# Patient Record
Sex: FEMALE | Race: BLACK OR AFRICAN AMERICAN | NOT HISPANIC OR LATINO | Employment: OTHER | URBAN - METROPOLITAN AREA
[De-identification: names, ages, dates, MRNs, and addresses within clinical notes are randomized per-mention and may not be internally consistent; named-entity substitution may affect disease eponyms.]

---

## 2023-11-02 RX ORDER — ONDANSETRON 4 MG/1
TABLET, ORALLY DISINTEGRATING ORAL
COMMUNITY
Start: 2023-05-10 | End: 2024-05-22

## 2023-11-02 RX ORDER — OXYCODONE AND ACETAMINOPHEN 5; 325 MG/1; MG/1
TABLET ORAL
COMMUNITY
Start: 2023-06-30 | End: 2024-01-03 | Stop reason: ALTCHOICE

## 2023-11-02 RX ORDER — LINEZOLID 600 MG/1
TABLET, FILM COATED ORAL
COMMUNITY
Start: 2022-07-21 | End: 2024-01-03 | Stop reason: ALTCHOICE

## 2023-11-02 RX ORDER — SUMATRIPTAN SUCCINATE 25 MG/1
TABLET ORAL
COMMUNITY
Start: 2023-02-11 | End: 2024-01-03 | Stop reason: ALTCHOICE

## 2023-11-02 RX ORDER — SULFAMETHOXAZOLE AND TRIMETHOPRIM 800; 160 MG/1; MG/1
1 TABLET ORAL 2 TIMES DAILY
COMMUNITY
Start: 2023-06-29 | End: 2024-01-03 | Stop reason: ALTCHOICE

## 2023-11-02 RX ORDER — AZITHROMYCIN 250 MG/1
TABLET, FILM COATED ORAL
COMMUNITY
Start: 2023-07-02 | End: 2024-01-03 | Stop reason: ALTCHOICE

## 2023-11-02 RX ORDER — TAMSULOSIN HYDROCHLORIDE 0.4 MG/1
0.4 CAPSULE ORAL DAILY
COMMUNITY
Start: 2023-10-19 | End: 2024-01-03 | Stop reason: ALTCHOICE

## 2023-11-02 RX ORDER — MIRABEGRON 50 MG/1
50 TABLET, FILM COATED, EXTENDED RELEASE ORAL DAILY
COMMUNITY
Start: 2023-10-19

## 2023-11-02 RX ORDER — PANTOPRAZOLE SODIUM 40 MG/1
40 TABLET, DELAYED RELEASE ORAL DAILY
COMMUNITY
End: 2024-06-05 | Stop reason: ALTCHOICE

## 2023-11-02 RX ORDER — PANTOPRAZOLE SODIUM 20 MG/1
TABLET, DELAYED RELEASE ORAL
COMMUNITY

## 2023-11-02 RX ORDER — VIBEGRON 75 MG/1
1 TABLET, FILM COATED ORAL DAILY
COMMUNITY
Start: 2023-10-04

## 2023-11-02 RX ORDER — INDAPAMIDE 2.5 MG/1
TABLET ORAL
COMMUNITY
End: 2024-01-03 | Stop reason: ALTCHOICE

## 2023-11-02 RX ORDER — EPINEPHRINE 0.3 MG/.3ML
INJECTION SUBCUTANEOUS
COMMUNITY
Start: 2018-09-28

## 2023-11-02 RX ORDER — METOPROLOL TARTRATE 25 MG/1
25 TABLET, FILM COATED ORAL 2 TIMES DAILY
COMMUNITY

## 2023-11-02 RX ORDER — CIPROFLOXACIN 500 MG/1
500 TABLET ORAL 2 TIMES DAILY
COMMUNITY
End: 2024-01-03 | Stop reason: ALTCHOICE

## 2023-11-02 RX ORDER — ACETAMINOPHEN 500 MG
TABLET ORAL
COMMUNITY
Start: 2022-08-12 | End: 2024-05-22

## 2023-11-02 RX ORDER — METOPROLOL SUCCINATE 25 MG/1
TABLET, EXTENDED RELEASE ORAL
COMMUNITY
End: 2024-06-05 | Stop reason: ALTCHOICE

## 2023-11-02 RX ORDER — POLYETHYLENE GLYCOL 3350, SODIUM SULFATE ANHYDROUS, SODIUM BICARBONATE, SODIUM CHLORIDE, POTASSIUM CHLORIDE 236; 22.74; 6.74; 5.86; 2.97 G/4L; G/4L; G/4L; G/4L; G/4L
POWDER, FOR SOLUTION ORAL
COMMUNITY
Start: 2023-04-13 | End: 2023-11-08

## 2023-11-02 RX ORDER — NYSTATIN 100000 [USP'U]/G
POWDER TOPICAL
COMMUNITY
Start: 2022-08-12 | End: 2024-01-03 | Stop reason: ALTCHOICE

## 2023-11-02 RX ORDER — MONTELUKAST SODIUM 10 MG/1
TABLET ORAL
COMMUNITY

## 2023-11-02 RX ORDER — FLUTICASONE PROPIONATE AND SALMETEROL 50; 250 UG/1; UG/1
POWDER RESPIRATORY (INHALATION)
COMMUNITY

## 2023-11-02 RX ORDER — OXYBUTYNIN CHLORIDE 10 MG/1
10 TABLET, EXTENDED RELEASE ORAL DAILY
COMMUNITY
End: 2024-01-03 | Stop reason: ALTCHOICE

## 2023-11-02 RX ORDER — ACETAMINOPHEN 325 MG/1
TABLET ORAL
COMMUNITY
End: 2024-05-22

## 2023-11-02 RX ORDER — OMEPRAZOLE 40 MG/1
CAPSULE, DELAYED RELEASE ORAL
COMMUNITY
Start: 2023-04-13 | End: 2024-01-03 | Stop reason: ALTCHOICE

## 2023-11-02 RX ORDER — ALBUTEROL SULFATE 90 UG/1
AEROSOL, METERED RESPIRATORY (INHALATION)
COMMUNITY
Start: 2012-12-17

## 2023-11-02 RX ORDER — NAPROXEN 500 MG/1
TABLET ORAL
COMMUNITY
End: 2024-01-03 | Stop reason: ALTCHOICE

## 2023-11-07 ENCOUNTER — APPOINTMENT (OUTPATIENT)
Dept: UROLOGY | Facility: CLINIC | Age: 48
End: 2023-11-07
Payer: MEDICARE

## 2023-11-08 ENCOUNTER — APPOINTMENT (OUTPATIENT)
Dept: RHEUMATOLOGY | Facility: CLINIC | Age: 48
End: 2023-11-08
Payer: MEDICARE

## 2023-11-13 ENCOUNTER — TRANSCRIBE ORDERS (OUTPATIENT)
Dept: GASTROENTEROLOGY | Facility: CLINIC | Age: 48
End: 2023-11-13

## 2023-11-13 ENCOUNTER — APPOINTMENT (OUTPATIENT)
Dept: UROLOGY | Facility: CLINIC | Age: 48
End: 2023-11-13
Payer: MEDICARE

## 2023-11-13 DIAGNOSIS — F32.A DEPRESSION, UNSPECIFIED DEPRESSION TYPE: Primary | ICD-10-CM

## 2023-12-05 ENCOUNTER — ANCILLARY PROCEDURE (OUTPATIENT)
Dept: RADIOLOGY | Facility: CLINIC | Age: 48
End: 2023-12-05
Payer: MEDICARE

## 2023-12-05 VITALS — HEIGHT: 65 IN | WEIGHT: 183.86 LBS | BODY MASS INDEX: 30.63 KG/M2

## 2023-12-05 DIAGNOSIS — Z12.31 ENCOUNTER FOR SCREENING MAMMOGRAM FOR MALIGNANT NEOPLASM OF BREAST: ICD-10-CM

## 2023-12-05 PROCEDURE — 77063 BREAST TOMOSYNTHESIS BI: CPT | Performed by: RADIOLOGY

## 2023-12-05 PROCEDURE — 77067 SCR MAMMO BI INCL CAD: CPT | Performed by: RADIOLOGY

## 2023-12-05 PROCEDURE — 77067 SCR MAMMO BI INCL CAD: CPT

## 2023-12-11 ENCOUNTER — APPOINTMENT (OUTPATIENT)
Dept: RHEUMATOLOGY | Facility: CLINIC | Age: 48
End: 2023-12-11
Payer: MEDICARE

## 2023-12-18 ENCOUNTER — APPOINTMENT (OUTPATIENT)
Dept: GASTROENTEROLOGY | Facility: HOSPITAL | Age: 48
End: 2023-12-18
Payer: MEDICARE

## 2023-12-21 ENCOUNTER — OFFICE VISIT (OUTPATIENT)
Dept: RHEUMATOLOGY | Facility: CLINIC | Age: 48
End: 2023-12-21
Payer: MEDICARE

## 2023-12-21 ENCOUNTER — LAB (OUTPATIENT)
Dept: LAB | Facility: LAB | Age: 48
End: 2023-12-21
Payer: MEDICARE

## 2023-12-21 VITALS
WEIGHT: 197 LBS | SYSTOLIC BLOOD PRESSURE: 150 MMHG | HEIGHT: 65 IN | DIASTOLIC BLOOD PRESSURE: 95 MMHG | BODY MASS INDEX: 32.82 KG/M2 | HEART RATE: 74 BPM

## 2023-12-21 DIAGNOSIS — M13.0 POLYARTHRITIS: ICD-10-CM

## 2023-12-21 DIAGNOSIS — M79.7 FIBROMYALGIA: Primary | ICD-10-CM

## 2023-12-21 DIAGNOSIS — M79.7 FIBROMYALGIA: ICD-10-CM

## 2023-12-21 LAB
C3 SERPL-MCNC: 130 MG/DL (ref 87–200)
C4 SERPL-MCNC: 30 MG/DL (ref 10–50)
CCP IGG SERPL-ACNC: <1 U/ML
CENTROMERE B AB SER-ACNC: <0.2 AI
CHROMATIN AB SERPL-ACNC: <0.2 AI
CRP SERPL-MCNC: 0.32 MG/DL
DSDNA AB SER-ACNC: <1 IU/ML
ENA JO1 AB SER QL IA: <0.2 AI
ENA RNP AB SER IA-ACNC: 0.3 AI
ENA SCL70 AB SER QL IA: <0.2 AI
ENA SM AB SER IA-ACNC: <0.2 AI
ENA SM+RNP AB SER QL IA: <0.2 AI
ENA SS-A AB SER IA-ACNC: <0.2 AI
ENA SS-B AB SER IA-ACNC: <0.2 AI
HCV AB SER QL: NONREACTIVE
IGG SERPL-MCNC: 1470 MG/DL (ref 700–1600)
IGG1 SER-MCNC: 1020 MG/DL (ref 490–1140)
IGG2 SER-MCNC: 276 MG/DL (ref 150–640)
IGG3 SER-MCNC: 61 MG/DL (ref 11–85)
IGG4 SER-MCNC: 2 MG/DL (ref 3–200)
LIPASE SERPL-CCNC: 139 U/L (ref 9–82)
RHEUMATOID FACT SER NEPH-ACNC: <10 IU/ML (ref 0–15)
RIBOSOMAL P AB SER-ACNC: <0.2 AI
URATE SERPL-MCNC: 4.2 MG/DL (ref 2.3–6.7)

## 2023-12-21 PROCEDURE — 86200 CCP ANTIBODY: CPT

## 2023-12-21 PROCEDURE — 86235 NUCLEAR ANTIGEN ANTIBODY: CPT

## 2023-12-21 PROCEDURE — 84550 ASSAY OF BLOOD/URIC ACID: CPT

## 2023-12-21 PROCEDURE — 86038 ANTINUCLEAR ANTIBODIES: CPT

## 2023-12-21 PROCEDURE — 86160 COMPLEMENT ANTIGEN: CPT

## 2023-12-21 PROCEDURE — 82784 ASSAY IGA/IGD/IGG/IGM EACH: CPT

## 2023-12-21 PROCEDURE — 83690 ASSAY OF LIPASE: CPT

## 2023-12-21 PROCEDURE — 86803 HEPATITIS C AB TEST: CPT

## 2023-12-21 PROCEDURE — 86225 DNA ANTIBODY NATIVE: CPT

## 2023-12-21 PROCEDURE — 83516 IMMUNOASSAY NONANTIBODY: CPT

## 2023-12-21 PROCEDURE — 99213 OFFICE O/P EST LOW 20 MIN: CPT | Performed by: INTERNAL MEDICINE

## 2023-12-21 PROCEDURE — 86036 ANCA SCREEN EACH ANTIBODY: CPT

## 2023-12-21 PROCEDURE — 36415 COLL VENOUS BLD VENIPUNCTURE: CPT

## 2023-12-21 PROCEDURE — 86431 RHEUMATOID FACTOR QUANT: CPT

## 2023-12-21 PROCEDURE — 86140 C-REACTIVE PROTEIN: CPT

## 2023-12-21 ASSESSMENT — PAIN SCALES - GENERAL: PAINLEVEL: 8

## 2023-12-21 NOTE — PROGRESS NOTES
PP: 48 year-old female with history of fibromyalgia, lumbar and cervical spondylosis, osteoarthritis of the knees and left shoulder.  CC: Lower back pain and right knee pain.  Jamul: She has a long-standing history of lower back pain with some extension into either lower extremity. She notes numbness and tingling in her fingers and toes. She had muscle spasms in her calves. She notes frequent urination and fecal incontinence. She sometimes is unable to feel when she needs to have a bowel movement. She has frequent nighttime urination that awakens her from sleep without urinary incontinence or burning dysuria. She had bladder mesh placed around 2008 or 2010 for urinary incontinence associated with coughing or sneezing. The bowel and urinary incontinence has been worsening over the past 2 years. The bowel incontinence occurs spontaneously. She has not noted any loose bowel movements. She has intermittent muscle spasms in the left inframammary region where she had rib fractures in the remote past. She has tried Topamax that caused abdominal discomfort as well as diarrhea, muscle relaxants (baclofen, cyclobenzaprine) antidepressants (amitriptyline, duloxetine) without significant improvement in symptoms. She previously felt that the gabapentin was contributing to her bowel problems.  However the bowel problems have continued since stopping the gabapentin.She was evaluated by gastroenterology and was felt to have spasms in the lower colon for which she was prescribed dicyclomine. She also notes pain involving her knees particularly the right knee and pain in the left shoulder.She continues to have pain in the right knee following surgical debridement of the right knee 7/18/2023 for nontraumatic inflammatory arthritis of the right knee for possible septic arthritis.  Cultures were negative.  She also notes pain in the shoulders and for the past 3 weeks she has noted numbness in the tip of the third digit of the left hand.   She is planning to see urology tomorrow and have EGD and colonoscopy tomorrow as well.  PH: Allergies: Penicillin (hives), Skelaxin (hives), tramadol (hives), be staying (asthma symptoms) Topamax (diarrhea); illnesses: Asthma, bilateral nephrolithiasis, osteoarthritis of the knees and left shoulder, cervical disc herniation C4/C5, C5/C6, and C6/C7, lumbar disc herniation L1/L2, L3/L4, L5/S1, fibromyalgia; surgeries bladder suspension with mesh ( or ), cystoscopy with laser lithotripsy (3/6/2012),  section, D&C, hysterectomy, lumbar epidural nerve block.,  Surgical debridement of the right knee (2023 for culture-negative septic arthritis).  SH: She is a current tobacco smoker. She drinks alcohol occasionally. She denies illicit drug use. She has been on disability since .  FH: Her father  with pancreatic cancer. Her mother has systemic lupus erythematosus, rheumatoid arthritis, hyperparathyroidism, vitamin D deficiency. She has a brother with asthma and another brother who is healthy. She has a son who  with heart attack.  She has 2 other sons with GERD and asthma. She has 3 daughters that are healthy. She has no sisters.  P X: She is a well-developed, well-nourished, mildly overweight, black female. The lungs are clear to auscultation. The heart is regular in rhythm. The abdomen is benign. The extremities are without edema.  The neurological examination shows that the Tinel sign is normal at both wrists. There is good muscle strength in the upper and lower extremities. The musculoskeletal examination shows preserved range of motion of the upper and lower extremity joints. There are no joint effusions.  There is pain on palpation as well as passive abduction and extension of the shoulders.The straight leg raise test is normal bilaterally in the seated position. There is lower back pain with hip flexion bilaterally.   Laboratory (2023) alkaline phosphatase 120, BUN 12, creatinine  0.71, glucose 100, lipase 141, WBC 12.1, hemoglobin 15.5, hematocrit 44.8, MCV 86, MCHC 34.6, platelets 362.  CT scan abdomen and pelvis (6/26/2023) the liver, pancreas, gallbladder, spleen, adrenal glands are normal.  There are small nonobstructing renal calculi in both kidneys.  The uterus is surgically absent.  The ovaries are normal.  Submucosal fat deposits within the ascending colon.  There is mild to moderate osteoarthritis of both hips.  There are mild degenerative changes of the spine.  Impression: 48  year-old black female with history of cervical and lumbar disc herniation, chronic lower back pain, numb paresthesias involving the hands and feet, fibromyalgia, osteoarthritis of the knees and left shoulder presented with history of radicular symptoms into the lower extremities and urinary and fecal incontinence or urgency. She has past history of bladder mesh suspension for urinary incontinence. Question neurogenic bowel and neurogenic bladder versus anatomic abnormalities.  Plan: She is to continue with urology regarding the bladder symptoms for possible neurogenic or structural etiology of the urinary and bowel incontinence or urgency.  Continue gastroenterology evaluation. She is to have laboratory for IgG subclasses, ANCA, KASIA panel, rheumatoid factor, anti-CCP, CRP, C3, C4.No new medications were prescribed. She is to continue with her current medications. She is to return at next available APPOINTMENT.

## 2023-12-21 NOTE — PATIENT INSTRUCTIONS
Patient has polyarticular inflammatory arthritis, fibromyalgia, urinary urgency, fecal incontinence, chronic abdominal pain, elevated serum lipase, s/p culture negative septic arthritis of the right knee.    She is to continue with plans for GI and Urology workup.

## 2023-12-22 ENCOUNTER — HOSPITAL ENCOUNTER (OUTPATIENT)
Dept: GASTROENTEROLOGY | Facility: HOSPITAL | Age: 48
Setting detail: OUTPATIENT SURGERY
Discharge: HOME | End: 2023-12-22
Payer: MEDICARE

## 2023-12-22 ENCOUNTER — ANESTHESIA (OUTPATIENT)
Dept: GASTROENTEROLOGY | Facility: HOSPITAL | Age: 48
End: 2023-12-22
Payer: MEDICARE

## 2023-12-22 ENCOUNTER — ANESTHESIA EVENT (OUTPATIENT)
Dept: GASTROENTEROLOGY | Facility: HOSPITAL | Age: 48
End: 2023-12-22
Payer: MEDICARE

## 2023-12-22 VITALS
DIASTOLIC BLOOD PRESSURE: 77 MMHG | OXYGEN SATURATION: 98 % | WEIGHT: 195 LBS | HEART RATE: 78 BPM | RESPIRATION RATE: 20 BRPM | TEMPERATURE: 98.6 F | BODY MASS INDEX: 32.49 KG/M2 | SYSTOLIC BLOOD PRESSURE: 135 MMHG | HEIGHT: 65 IN

## 2023-12-22 DIAGNOSIS — R15.9 FULL INCONTINENCE OF FECES: Primary | ICD-10-CM

## 2023-12-22 DIAGNOSIS — R10.9 UNSPECIFIED ABDOMINAL PAIN: ICD-10-CM

## 2023-12-22 DIAGNOSIS — K21.9 GASTRO-ESOPHAGEAL REFLUX DISEASE WITHOUT ESOPHAGITIS: ICD-10-CM

## 2023-12-22 PROBLEM — N39.0 URINARY TRACT INFECTION: Status: ACTIVE | Noted: 2023-12-22

## 2023-12-22 PROBLEM — N20.0 RENAL CALCULI: Status: ACTIVE | Noted: 2023-12-22

## 2023-12-22 PROBLEM — M79.7 FIBROMYALGIA: Status: ACTIVE | Noted: 2023-12-22

## 2023-12-22 PROBLEM — J45.40 MODERATE PERSISTENT ASTHMA WITHOUT COMPLICATION (HHS-HCC): Status: ACTIVE | Noted: 2023-12-22

## 2023-12-22 PROCEDURE — 43235 EGD DIAGNOSTIC BRUSH WASH: CPT | Performed by: INTERNAL MEDICINE

## 2023-12-22 PROCEDURE — 7100000010 HC PHASE TWO TIME - EACH INCREMENTAL 1 MINUTE

## 2023-12-22 PROCEDURE — 2500000004 HC RX 250 GENERAL PHARMACY W/ HCPCS (ALT 636 FOR OP/ED)

## 2023-12-22 PROCEDURE — 88305 TISSUE EXAM BY PATHOLOGIST: CPT | Performed by: PATHOLOGY

## 2023-12-22 PROCEDURE — 2500000002 HC RX 250 W HCPCS SELF ADMINISTERED DRUGS (ALT 637 FOR MEDICARE OP, ALT 636 FOR OP/ED): Performed by: ANESTHESIOLOGY

## 2023-12-22 PROCEDURE — 3700000002 HC GENERAL ANESTHESIA TIME - EACH INCREMENTAL 1 MINUTE

## 2023-12-22 PROCEDURE — 3700000001 HC GENERAL ANESTHESIA TIME - INITIAL BASE CHARGE

## 2023-12-22 PROCEDURE — A45385 PR COLONOSCOPY,REMV LESN,SNARE

## 2023-12-22 PROCEDURE — 88305 TISSUE EXAM BY PATHOLOGIST: CPT | Mod: TC,SUR | Performed by: INTERNAL MEDICINE

## 2023-12-22 PROCEDURE — 7100000009 HC PHASE TWO TIME - INITIAL BASE CHARGE

## 2023-12-22 PROCEDURE — 2500000005 HC RX 250 GENERAL PHARMACY W/O HCPCS

## 2023-12-22 PROCEDURE — A45385 PR COLONOSCOPY,REMV LESN,SNARE: Performed by: ANESTHESIOLOGY

## 2023-12-22 PROCEDURE — 45385 COLONOSCOPY W/LESION REMOVAL: CPT | Performed by: INTERNAL MEDICINE

## 2023-12-22 RX ORDER — HYDRALAZINE HYDROCHLORIDE 20 MG/ML
5 INJECTION INTRAMUSCULAR; INTRAVENOUS EVERY 30 MIN PRN
Status: DISCONTINUED | OUTPATIENT
Start: 2023-12-22 | End: 2023-12-23 | Stop reason: HOSPADM

## 2023-12-22 RX ORDER — DROPERIDOL 2.5 MG/ML
0.62 INJECTION, SOLUTION INTRAMUSCULAR; INTRAVENOUS ONCE AS NEEDED
Status: DISCONTINUED | OUTPATIENT
Start: 2023-12-22 | End: 2023-12-23 | Stop reason: HOSPADM

## 2023-12-22 RX ORDER — OXYCODONE HYDROCHLORIDE 5 MG/1
5 TABLET ORAL EVERY 4 HOURS PRN
Status: DISCONTINUED | OUTPATIENT
Start: 2023-12-22 | End: 2023-12-23 | Stop reason: HOSPADM

## 2023-12-22 RX ORDER — LABETALOL HYDROCHLORIDE 5 MG/ML
5 INJECTION, SOLUTION INTRAVENOUS ONCE AS NEEDED
Status: DISCONTINUED | OUTPATIENT
Start: 2023-12-22 | End: 2023-12-23 | Stop reason: HOSPADM

## 2023-12-22 RX ORDER — MIDAZOLAM HYDROCHLORIDE 1 MG/ML
INJECTION INTRAMUSCULAR; INTRAVENOUS AS NEEDED
Status: DISCONTINUED | OUTPATIENT
Start: 2023-12-22 | End: 2023-12-22

## 2023-12-22 RX ORDER — HYDROMORPHONE HYDROCHLORIDE 1 MG/ML
0.2 INJECTION, SOLUTION INTRAMUSCULAR; INTRAVENOUS; SUBCUTANEOUS EVERY 5 MIN PRN
Status: DISCONTINUED | OUTPATIENT
Start: 2023-12-22 | End: 2023-12-23 | Stop reason: HOSPADM

## 2023-12-22 RX ORDER — SODIUM CHLORIDE, SODIUM LACTATE, POTASSIUM CHLORIDE, CALCIUM CHLORIDE 600; 310; 30; 20 MG/100ML; MG/100ML; MG/100ML; MG/100ML
100 INJECTION, SOLUTION INTRAVENOUS CONTINUOUS
Status: DISCONTINUED | OUTPATIENT
Start: 2023-12-22 | End: 2023-12-23 | Stop reason: HOSPADM

## 2023-12-22 RX ORDER — LIDOCAINE HYDROCHLORIDE 20 MG/ML
INJECTION, SOLUTION INFILTRATION; PERINEURAL AS NEEDED
Status: DISCONTINUED | OUTPATIENT
Start: 2023-12-22 | End: 2023-12-22

## 2023-12-22 RX ORDER — METHOCARBAMOL 100 MG/ML
1000 INJECTION, SOLUTION INTRAMUSCULAR; INTRAVENOUS ONCE
Status: DISCONTINUED | OUTPATIENT
Start: 2023-12-22 | End: 2023-12-23 | Stop reason: HOSPADM

## 2023-12-22 RX ORDER — MIDAZOLAM HYDROCHLORIDE 1 MG/ML
0.5 INJECTION INTRAMUSCULAR; INTRAVENOUS
Status: DISCONTINUED | OUTPATIENT
Start: 2023-12-22 | End: 2023-12-23 | Stop reason: HOSPADM

## 2023-12-22 RX ORDER — HYDROMORPHONE HYDROCHLORIDE 1 MG/ML
0.4 INJECTION, SOLUTION INTRAMUSCULAR; INTRAVENOUS; SUBCUTANEOUS EVERY 5 MIN PRN
Status: DISCONTINUED | OUTPATIENT
Start: 2023-12-22 | End: 2023-12-23 | Stop reason: HOSPADM

## 2023-12-22 RX ORDER — PROPOFOL 10 MG/ML
INJECTION, EMULSION INTRAVENOUS CONTINUOUS PRN
Status: DISCONTINUED | OUTPATIENT
Start: 2023-12-22 | End: 2023-12-22

## 2023-12-22 RX ORDER — DIPHENHYDRAMINE HYDROCHLORIDE 50 MG/ML
12.5 INJECTION INTRAMUSCULAR; INTRAVENOUS ONCE AS NEEDED
Status: DISCONTINUED | OUTPATIENT
Start: 2023-12-22 | End: 2023-12-23 | Stop reason: HOSPADM

## 2023-12-22 RX ORDER — LIDOCAINE HYDROCHLORIDE 10 MG/ML
0.1 INJECTION INFILTRATION; PERINEURAL ONCE
Status: DISCONTINUED | OUTPATIENT
Start: 2023-12-22 | End: 2023-12-23 | Stop reason: HOSPADM

## 2023-12-22 RX ORDER — ALBUTEROL SULFATE 0.83 MG/ML
2.5 SOLUTION RESPIRATORY (INHALATION) ONCE AS NEEDED
Status: COMPLETED | OUTPATIENT
Start: 2023-12-22 | End: 2023-12-22

## 2023-12-22 RX ORDER — ACETAMINOPHEN 325 MG/1
650 TABLET ORAL EVERY 4 HOURS PRN
Status: DISCONTINUED | OUTPATIENT
Start: 2023-12-22 | End: 2023-12-23 | Stop reason: HOSPADM

## 2023-12-22 RX ORDER — ONDANSETRON HYDROCHLORIDE 2 MG/ML
4 INJECTION, SOLUTION INTRAVENOUS ONCE AS NEEDED
Status: DISCONTINUED | OUTPATIENT
Start: 2023-12-22 | End: 2023-12-23 | Stop reason: HOSPADM

## 2023-12-22 RX ADMIN — MIDAZOLAM HYDROCHLORIDE 2 MG: 1 INJECTION, SOLUTION INTRAMUSCULAR; INTRAVENOUS at 14:03

## 2023-12-22 RX ADMIN — SODIUM CHLORIDE, SODIUM LACTATE, POTASSIUM CHLORIDE, AND CALCIUM CHLORIDE: 600; 310; 30; 20 INJECTION, SOLUTION INTRAVENOUS at 14:05

## 2023-12-22 RX ADMIN — LIDOCAINE HYDROCHLORIDE 5 ML: 20 INJECTION, SOLUTION INFILTRATION; PERINEURAL at 14:06

## 2023-12-22 RX ADMIN — PROPOFOL 200 MCG/KG/MIN: 10 INJECTION, EMULSION INTRAVENOUS at 14:06

## 2023-12-22 RX ADMIN — ALBUTEROL SULFATE 2.5 MG: 2.5 SOLUTION RESPIRATORY (INHALATION) at 15:10

## 2023-12-22 ASSESSMENT — PAIN - FUNCTIONAL ASSESSMENT
PAIN_FUNCTIONAL_ASSESSMENT: 0-10

## 2023-12-22 ASSESSMENT — PAIN SCALES - GENERAL
PAINLEVEL_OUTOF10: 0 - NO PAIN

## 2023-12-22 ASSESSMENT — COLUMBIA-SUICIDE SEVERITY RATING SCALE - C-SSRS
2. HAVE YOU ACTUALLY HAD ANY THOUGHTS OF KILLING YOURSELF?: NO
6. HAVE YOU EVER DONE ANYTHING, STARTED TO DO ANYTHING, OR PREPARED TO DO ANYTHING TO END YOUR LIFE?: NO
1. IN THE PAST MONTH, HAVE YOU WISHED YOU WERE DEAD OR WISHED YOU COULD GO TO SLEEP AND NOT WAKE UP?: NO

## 2023-12-22 NOTE — DISCHARGE INSTRUCTIONS
Patient Instructions after an endoscopy or colonoscopy      The anesthetics, sedatives or narcotics which were given to you today will be acting in your body for the next 24 hours, so you might feel a little sleepy or groggy.  This feeling should slowly wear off. Carefully read and follow the instructions.     You received sedation today:  - Do not drive or operate any machinery or power tools of any kind.   - No alcoholic beverages today, not even beer or wine.  - Do not make any important decisions or sign any legal documents.  - No over the counter medications that contain alcohol or that may cause drowsiness.  - Do not make any important decisions or sign any legal documents.    While it is common to experience mild to moderate abdominal distention, gas, or belching after your procedure, if any of these symptoms occur following discharge from the GI Lab or within one week of having your procedure, call the [facility] to be advised whether a visit to your nearest Urgent Care or Emergency Department is indicated.  Take this paper with you if you go.     - If you develop an allergic reaction to the medications that were given during your procedure such as difficulty breathing, rash, hives, severe nausea, vomiting or lightheadedness.- If you experience chest pain, shortness of breath, severe abdominal pain, fevers and chills.    -If you develop signs and symptoms of bleeding such as blood in your spit, if your stools turn black, tarry, or bloody    - If you have not urinated within 8 hours following your procedure.- If your IV site becomes painful, red, inflamed, or looks infected.Patient Instructions after a Colonoscopy      The anesthetics, sedatives or narcotics which were given to you today will be acting in your body for the next 24 hours, so you might feel a little sleepy or groggy.  This feeling should slowly wear off. Carefully read and follow the instructions.     You received sedation  today:  - Do not drive or operate any machinery or power tools of any kind.   - No alcoholic beverages today, not even beer or wine.  - Do not make any important decisions or sign any legal documents.  - No over the counter medications that contain alcohol or that may cause drowsiness.  - Do not make any important decisions or sign any legal documents.    While it is common to experience mild to moderate abdominal distention, gas, or belching after your procedure, if any of these symptoms occur following discharge from the GI Lab or within one week of having your procedure, call the [facility] to be advised whether a visit to your nearest Urgent Care or Emergency Department is indicated.  Take this paper with you if you go.     - If you develop an allergic reaction to the medications that were given during your procedure such as difficulty breathing, rash, hives, severe nausea, vomiting or lightheadedness.  - If you experience chest pain, shortness of breath, severe abdominal pain, fevers and chills.  -If you develop signs and symptoms of bleeding such as blood in your spit, if your stools turn black, tarry, or bloody  - If you have not urinated within 8 hours following your procedure.  - If your IV site becomes painful, red, inflamed, or looks infected.    If you received a biopsy/polypectomy/sphincterotomy the following instructions apply below:    __ Do not use Aspirin containing products, non-steroidal medications or anti-coagulants for one week following your procedure. (Examples of these types of medications are: Advil, Arthrotec, Aleve, Coumadin, Ecotrin, Heparin, Ibuprofen, Indocin, Motrin, Naprosyn, Nuprin, Plavix, Vioxx, and Voltarin, or their generic forms.  This list is not all-inclusive.  Check with your physician or pharmacist before resuming medications.)   __ Eat a soft diet today.  Avoid foods that are poorly digested for the next 24 hours.  These foods would include: nuts, beans,  lettuce, red meats, and fried foods. Start with liquids and advance your diet as tolerated, gradually work up to eating solids.   __ Do not have a Barium Study or Enema for one week.    Your physician recommends the additional following instructions:    -You have a contact number available for emergencies. The signs and symptoms of potential delayed complications were discussed with you. You may return to normal activities tomorrow.  -Resume your previous diet.  -Continue your present medications.   -We are waiting for your pathology results.  -Your physician has recommended a repeat colonoscopy (date to be determined after pending pathology results are reviewed) for surveillance based on pathology results.  -The findings and recommendations have been discussed with you.  -The findings and recommendations were discussed with your family.  - Please see Medication Reconciliation Form for new medication/medications prescribed.       If you experience any problems or have any questions following discharge from the GI Lab, please call:        Nurse Signature                                                                        Date___________________                                                                            Patient/Responsible Party Signature                                        Date___________________

## 2023-12-22 NOTE — ANESTHESIA POSTPROCEDURE EVALUATION
Patient: Sonali Keen    Procedure Summary       Date: 12/22/23 Room / Location: [facility]    Anesthesia Start: 1405 Anesthesia Stop: 1445    Procedures:       COLONOSCOPY      EGD Diagnosis:       Full incontinence of feces      Unspecified abdominal pain      Gastro-esophageal reflux disease without esophagitis    Scheduled Providers: Hector Huerta MD; Juventino Nunez RN; Janelle Madison MD Responsible Provider: Janelle Madison MD    Anesthesia Type: MAC ASA Status: 3            Anesthesia Type: MAC    Vitals Value Taken Time   /97 12/22/23 1455   Temp 97.6 F 12/22/23 1455   Pulse 77 12/22/23 1455   Resp 19 12/22/23 1455   SpO2 100 12/22/23 1455       Anesthesia Post Evaluation    Patient location during evaluation: PACU  Patient participation: complete - patient participated  Level of consciousness: awake  Pain management: adequate  Airway patency: patent  Cardiovascular status: acceptable  Respiratory status: acceptable  Hydration status: acceptable  Postoperative Nausea and Vomiting: none        No notable events documented.

## 2023-12-22 NOTE — H&P
History Of Present Illness  Sonali Keen is a 48 y.o. female presenting with heartburn, nausea, abdominal pain, and fecal incontinence for EGD and colonoscopy ordered 2023.     Past Medical History  Past Medical History:   Diagnosis Date    Arthropathy, unspecified 10/07/2013    Arthropathy    Personal history of other (healed) physical injury and trauma 2014    History of back injury    Personal history of other diseases of the musculoskeletal system and connective tissue     History of osteoporosis    Personal history of other diseases of the respiratory system     History of asthma    Personal history of other diseases of urinary system     History of kidney disease    Sprain of ligaments of lumbar spine, initial encounter 10/07/2013    Lumbar sprain       Surgical History  Past Surgical History:   Procedure Laterality Date     SECTION, CLASSIC  2014     Section    DILATION AND CURETTAGE OF UTERUS  2014    Dilation And Curettage    HYSTERECTOMY  2014    Hysterectomy    OTHER SURGICAL HISTORY  2019    Mid-urethral sling insertion    OTHER SURGICAL HISTORY  2014    Nerve Block        Social History  She reports that she has been smoking cigarettes. She has been smoking an average of 2 packs per day. She has never used smokeless tobacco. She reports current alcohol use. She reports that she does not use drugs.    Family History  No family history on file.     Allergies  Bee venom protein (honey bee), Metaxalone, Penicillins, Tramadol, and Sulfamethoxazole-trimethoprim         Physical Exam  Constitutional:       Appearance: Normal appearance.   HENT:      Mouth/Throat:      Mouth: Mucous membranes are moist.      Pharynx: Oropharynx is clear.   Eyes:      Conjunctiva/sclera: Conjunctivae normal.   Cardiovascular:      Rate and Rhythm: Normal rate.   Pulmonary:      Effort: Pulmonary effort is normal.   Abdominal:      Palpations: Abdomen is soft.    Skin:     General: Skin is warm.   Neurological:      Mental Status: She is oriented to person, place, and time.   Psychiatric:         Mood and Affect: Mood normal.          Last Recorded Vitals  There were no vitals taken for this visit.    Relevant Results             Assessment/Plan   Active Problems:  There are no active Hospital Problems.      heartburn, nausea, abdominal pain, and fecal incontinence for EGD and colonoscopy ordered April 12, 2023         Hector Huerta MD

## 2023-12-22 NOTE — ANESTHESIA PREPROCEDURE EVALUATION
Patient: Sonali Keen    Procedure Information       Date/Time: 12/22/23 1400    Scheduled providers: Hector Huerta MD; Juventino Nunez RN; Janelle Madison MD    Procedures:       COLONOSCOPY      EGD    Location: [facility]            Relevant Problems   GI  Very frequent need to use the bathroom as reason for colonoscopy      /Renal  Hx of stones requiring surgical treatment, and recurrent UTIs   (+) Renal calculi      Pulmonary  Takes albuterol 2x per day and a disk once daily (is supposed to use the disk 2x per day)   (+) Moderate persistent asthma without complication      Musculoskeletal  Hx of bacterial infection in knee   (+) Fibromyalgia       Clinical information reviewed:   Tobacco  Allergies  Meds   Med Hx  Surg Hx   Fam Hx  Soc Hx        NPO Detail:  NPO/Void Status  Date of Last Liquid: 12/22/23  Time of Last Liquid: 1000  Date of Last Solid: 12/21/23  Time of Last Solid: 1400  Last Intake Type: Clear fluids  Time of Last Void: 1336         Physical Exam    Airway  Mallampati: I  TM distance: >3 FB  Neck ROM: limited     Cardiovascular   Rhythm: regular     Dental        Pulmonary    Abdominal            Anesthesia Plan    ASA 3     MAC     intravenous induction   Anesthetic plan and risks discussed with patient.    Plan discussed with CAA and attending.

## 2023-12-26 LAB
ANA PATTERN: ABNORMAL
ANA SER QL HEP2 SUBST: POSITIVE
ANA TITR SER IF: ABNORMAL {TITER}

## 2023-12-27 ENCOUNTER — APPOINTMENT (OUTPATIENT)
Dept: PRIMARY CARE | Facility: CLINIC | Age: 48
End: 2023-12-27
Payer: MEDICARE

## 2023-12-27 LAB
ANCA AB PATTERN SER IF-IMP: NORMAL
ANCA IGG TITR SER IF: NORMAL {TITER}
MYELOPEROXIDASE AB SER-ACNC: 0 AU/ML (ref 0–19)
PROTEINASE3 AB SER-ACNC: 0 AU/ML (ref 0–19)

## 2023-12-31 ENCOUNTER — HOSPITAL ENCOUNTER (EMERGENCY)
Facility: HOSPITAL | Age: 48
Discharge: HOME | End: 2023-12-31
Payer: MEDICARE

## 2023-12-31 VITALS
TEMPERATURE: 99 F | HEIGHT: 65 IN | WEIGHT: 186 LBS | HEART RATE: 80 BPM | OXYGEN SATURATION: 98 % | RESPIRATION RATE: 18 BRPM | DIASTOLIC BLOOD PRESSURE: 89 MMHG | SYSTOLIC BLOOD PRESSURE: 159 MMHG | BODY MASS INDEX: 30.99 KG/M2

## 2023-12-31 DIAGNOSIS — T24.112A SUPERFICIAL BURN OF LEFT THIGH, INITIAL ENCOUNTER: Primary | ICD-10-CM

## 2023-12-31 PROCEDURE — 2500000001 HC RX 250 WO HCPCS SELF ADMINISTERED DRUGS (ALT 637 FOR MEDICARE OP)

## 2023-12-31 PROCEDURE — 16000 INITIAL TREATMENT OF BURN(S): CPT

## 2023-12-31 PROCEDURE — 99283 EMERGENCY DEPT VISIT LOW MDM: CPT

## 2023-12-31 PROCEDURE — 99282 EMERGENCY DEPT VISIT SF MDM: CPT | Mod: 25

## 2023-12-31 RX ORDER — ACETAMINOPHEN 325 MG/1
650 TABLET ORAL ONCE
Status: COMPLETED | OUTPATIENT
Start: 2023-12-31 | End: 2023-12-31

## 2023-12-31 RX ORDER — BACITRACIN ZINC 500 UNIT/G
OINTMENT (GRAM) TOPICAL 2 TIMES DAILY
Qty: 14 G | Refills: 0 | Status: SHIPPED | OUTPATIENT
Start: 2023-12-31

## 2023-12-31 RX ORDER — BACITRACIN ZINC 500 UNIT/G
1 OINTMENT IN PACKET (EA) TOPICAL ONCE
Status: COMPLETED | OUTPATIENT
Start: 2023-12-31 | End: 2023-12-31

## 2023-12-31 RX ADMIN — BACITRACIN 1 APPLICATION: 500 OINTMENT TOPICAL at 21:25

## 2023-12-31 RX ADMIN — ACETAMINOPHEN 650 MG: 325 TABLET ORAL at 21:25

## 2023-12-31 ASSESSMENT — PAIN SCALES - GENERAL: PAINLEVEL_OUTOF10: 10 - WORST POSSIBLE PAIN

## 2023-12-31 ASSESSMENT — PAIN - FUNCTIONAL ASSESSMENT: PAIN_FUNCTIONAL_ASSESSMENT: 0-10

## 2024-01-01 NOTE — ED PROVIDER NOTES
HPI   Chief Complaint   Patient presents with    Burn     Pt presents to ED for L anterior burn to thigh after spilling hot soup on extremity yesterday on accident. Skin observed with discoloration. No blisters observed. Denies CP/SOB.        HPI  HPI: This is a 48 year old female who presents to the ER complaining of a burn to the left anterior thigh.  Patient states that she was carrying hot soup yesterday, bag ripped, and hot soup spilled onto her left anterior thigh.  She reports pain and redness about the area.  She states that she immediately rinsed the area with cool water.  She denies any wounds, denies bleeding or drainage.  Denies any blister formation.  No other areas of burn or injuries, no pain in any other body part.  Has not taken any medications for the pain.  No other complaints or symptoms voiced.  No chest pain or shortness of breath, no dizziness or lightheadedness.  No headache, no blurry vision.  States that she is up-to-date on tetanus.      ROS:  General: No decreased responsiveness, no fever, chills  Neuro: no lightheadedness or dizziness, no numbness or tingling  ENMT: No nosebleed  Eyes: No discharge or redness  Skel: No joint pain  Cardiac: No chest pain   Resp: No shortness of breath  Skin: no rash +burn left thigh    PMH: asthma, fibromyalgia  Social History: nonsmoker, no EtOH, no drugs  Family History: Noncontributory        No data recorded                Patient History   Past Medical History:   Diagnosis Date    Arthropathy, unspecified 10/07/2013    Arthropathy    Personal history of other (healed) physical injury and trauma 06/19/2014    History of back injury    Personal history of other diseases of the musculoskeletal system and connective tissue     History of osteoporosis    Personal history of other diseases of the respiratory system     History of asthma    Personal history of other diseases of urinary system     History of kidney disease    Sprain of ligaments of lumbar  spine, initial encounter 10/07/2013    Lumbar sprain     Past Surgical History:   Procedure Laterality Date     SECTION, CLASSIC  2014     Section    DILATION AND CURETTAGE OF UTERUS  2014    Dilation And Curettage    HYSTERECTOMY  2014    Hysterectomy    OTHER SURGICAL HISTORY  2019    Mid-urethral sling insertion    OTHER SURGICAL HISTORY  2014    Nerve Block     No family history on file.  Social History     Tobacco Use    Smoking status: Every Day     Packs/day: 2     Types: Cigarettes    Smokeless tobacco: Never   Substance Use Topics    Alcohol use: Yes     Comment: occ    Drug use: Never       Physical Exam   ED Triage Vitals [23]   Temp Heart Rate Resp BP   37.2 °C (99 °F) 88 20 (!) 166/100      SpO2 Temp Source Heart Rate Source Patient Position   100 % Temporal Monitor --      BP Location FiO2 (%)     -- --       Physical Exam  General: Vital signs stable, Pt is alert, no acute distress  Eyes: Conjunctiva normal, PERRL, EOMs intact  HENMT: Normocephalic, atraumatic,  Moist mucous membranes.   Resp: Respiratory effort is normal,  Skin: Intact, warm, dry. +[id number] superficial burn on the anterior left thigh, mild erythema, appropriately tender to palpation.  No fluctuance or induration.  No blisters.  No weeping or oozing, no drainage or bleeding.  Skin is intact, no sloughing of skin, no blister formation.  Appears very superficial.  Intact sensation throughout the skin.  Extremities neurovascularly intact.  Skel: full range of motion of upper and lower extremities.   Neuro: Normal gait, CN II-XII intact, no motor or sensory changes  Psych: Alert and oriented ×3, judgment is appropriate, normal mood and affect   ED Course & MDM   Diagnoses as of 23   Superficial burn of left thigh, initial encounter       Medical Decision Making  ED course / MDM     Summary:  Patient presented with a thermal burn to the left anterior thigh.  Spilled hot  soup on the area yesterday.  Vital signs are stable, mildly hypertensive in triage at 166/100, has a history of hypertension and reports compliance with her blood pressure medications.  Patient is very well-appearing, ambulates unassisted, no acute distress.  On exam, there is a small superficial thermal burn on the anterior left thigh, appropriately tender, skin is intact, no blisters, no oozing or weeping, no bleeding. Sensation intact about the area.  Consistent with a superficial thermal burn.  Patient is up-to-date on tetanus.  Discussed supportive care instructions in detail including pain control and wound care.  Wound was dressed with bacitracin by nursing.  Prescription sent for bacitracin to her pharmacy as well.  Patient does have a PCP and states that she will be able to follow-up within the next week for a wound check.  No current evidence of infection, is superficial, no indication for antibiotic treatment. Patient was given strict return precautions, understands reasons to return to the ED. Also discussed supportive care instructions. I expressed the importance of outpatient follow up with their PCP. All questions were answered, patient expressed understanding and stated that they would comply.    Patient was advised to follow up with PCP or recommended provider in 2-3 days for another evaluation and exam. I advised patient and family/friend/caregiver/guardian to return or go to closest emergency room immediately if symptoms change, get worse, new symptoms develop prior to follow up. If there is no improvement in symptoms in the next 24 hours they are advised to return for further evaluation and exam. I also explained the plan and treatment course. Patient and family/friend/caregiver/guardian is in agreement with plan, treatment course, and follow up and states verbally that they will comply.    Impression:  1. See diagnosis    Plan: Homegoing. I discussed the differential, results, and discharge plan  with the patient and family/friend/caregiver. I emphasized the importance of follow-up with the physician I referred them to in the timeframe recommended.  I explained reasons for the patient to return to the Emergency Department. They agreed that if they feel their condition is worsening or if they have any other concern they should call 911 immediately for further assistance. We also discussed medications that were prescribed including common side effects and interactions. The patient was advised to abstain from driving, operating heavy machinery, or making significant decisions while taking medications such as opiates and muscle relaxers that may impair this. I gave the patient an opportunity to ask all questions they had and answered all of them accordingly. They understand return precautions and discharge instructions. The patient and family/friend/caregiver expressed understanding verbally and that they would comply.       Disposition: Discharge    This note has been transcribed using voice recognition and may contain grammatical errors, misplaced words, incorrect words, incorrect phrases or other errors.   Procedure  Procedures     Carlita Nunez PA-C  12/31/23 2116

## 2024-01-02 DIAGNOSIS — Z12.11 COLON CANCER SCREENING: Primary | ICD-10-CM

## 2024-01-02 LAB
LABORATORY COMMENT REPORT: NORMAL
PATH REPORT.FINAL DX SPEC: NORMAL
PATH REPORT.GROSS SPEC: NORMAL
PATH REPORT.TOTAL CANCER: NORMAL

## 2024-01-02 RX ORDER — POLYETHYLENE GLYCOL 3350, SODIUM CHLORIDE, SODIUM BICARBONATE AND POTASSIUM CHLORIDE 420; 5.72; 11.2; 1.48 G/4L; G/4L; G/4L; G/4L
8000 POWDER, FOR SOLUTION ORAL ONCE
Qty: 8000 ML | Refills: 0 | Status: SHIPPED | OUTPATIENT
Start: 2024-01-02 | End: 2024-01-02

## 2024-01-02 NOTE — RESULT ENCOUNTER NOTE
A  [email or web address] message was sent to patient regarding the results and recommendations as follows:    Dear Ms. Keen,    I am writing you to inform you of the good news that the polyps that we removed from your colon revealed no pre-cancerous changes or cancer in them.  A repeat average risk screening colonoscopy with a more extensive prep was ordered to be done at next available appointment because your colon was not cleaned out.    Copies of all the reports were sent to your referring primary care physician.     If you have any questions regarding your colonoscopy and/or pathology results, please do not hesitate to contact me at [phone] or reply to this message.  Thank you for allowing us to participate in your medical care.    Sincerely,    Hector Huerta MD, RAKAN  Chief Medical   [facility]  Associate Chief and Director of Clinical Operations  Division of Gastroenterology and Liver Disease   Master Clinician  [facility]  Professor of Medicine  [facility]

## 2024-01-03 ENCOUNTER — OFFICE VISIT (OUTPATIENT)
Dept: RHEUMATOLOGY | Facility: CLINIC | Age: 49
End: 2024-01-03
Payer: MEDICARE

## 2024-01-03 VITALS
BODY MASS INDEX: 32.99 KG/M2 | WEIGHT: 198 LBS | HEIGHT: 65 IN | SYSTOLIC BLOOD PRESSURE: 146 MMHG | HEART RATE: 65 BPM | TEMPERATURE: 96.8 F | DIASTOLIC BLOOD PRESSURE: 96 MMHG

## 2024-01-03 DIAGNOSIS — T30.0 BURN: Primary | ICD-10-CM

## 2024-01-03 DIAGNOSIS — M19.90 ARTHRITIS: ICD-10-CM

## 2024-01-03 PROCEDURE — 99213 OFFICE O/P EST LOW 20 MIN: CPT | Performed by: INTERNAL MEDICINE

## 2024-01-03 RX ORDER — PREDNISONE 10 MG/1
10 TABLET ORAL DAILY
Qty: 10 TABLET | Refills: 0 | Status: SHIPPED | OUTPATIENT
Start: 2024-01-03 | End: 2024-01-13

## 2024-01-03 ASSESSMENT — PAIN SCALES - GENERAL: PAINLEVEL: 8
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
## 2024-01-24 ENCOUNTER — APPOINTMENT (OUTPATIENT)
Dept: RHEUMATOLOGY | Facility: CLINIC | Age: 49
End: 2024-01-24
Payer: MEDICARE

## 2024-02-24 ENCOUNTER — HOSPITAL ENCOUNTER (EMERGENCY)
Facility: HOSPITAL | Age: 49
Discharge: HOME | End: 2024-02-24
Attending: EMERGENCY MEDICINE
Payer: MEDICARE

## 2024-02-24 VITALS
BODY MASS INDEX: 29.99 KG/M2 | SYSTOLIC BLOOD PRESSURE: 129 MMHG | TEMPERATURE: 97.9 F | HEART RATE: 67 BPM | RESPIRATION RATE: 18 BRPM | OXYGEN SATURATION: 99 % | WEIGHT: 180 LBS | HEIGHT: 65 IN | DIASTOLIC BLOOD PRESSURE: 72 MMHG

## 2024-02-24 DIAGNOSIS — B96.89 BACTERIAL VAGINOSIS: ICD-10-CM

## 2024-02-24 DIAGNOSIS — N39.0 ACUTE LOWER UTI: Primary | ICD-10-CM

## 2024-02-24 DIAGNOSIS — N76.0 BACTERIAL VAGINOSIS: ICD-10-CM

## 2024-02-24 DIAGNOSIS — N12 PYELONEPHRITIS: ICD-10-CM

## 2024-02-24 LAB
ALBUMIN SERPL BCP-MCNC: 3.6 G/DL (ref 3.4–5)
ALP SERPL-CCNC: 69 U/L (ref 33–110)
ALT SERPL W P-5'-P-CCNC: 10 U/L (ref 7–45)
ANION GAP BLDV CALCULATED.4IONS-SCNC: 9 MMOL/L (ref 10–25)
ANION GAP SERPL CALC-SCNC: 13 MMOL/L (ref 10–20)
APPEARANCE UR: ABNORMAL
AST SERPL W P-5'-P-CCNC: 10 U/L (ref 9–39)
BACTERIA #/AREA URNS AUTO: ABNORMAL /HPF
BASE EXCESS BLDV CALC-SCNC: 0.3 MMOL/L (ref -2–3)
BASOPHILS # BLD AUTO: 0.04 X10*3/UL (ref 0–0.1)
BASOPHILS NFR BLD AUTO: 0.4 %
BILIRUB DIRECT SERPL-MCNC: 0.1 MG/DL (ref 0–0.3)
BILIRUB SERPL-MCNC: 0.4 MG/DL (ref 0–1.2)
BILIRUB UR STRIP.AUTO-MCNC: NEGATIVE MG/DL
BODY TEMPERATURE: 37 DEGREES CELSIUS
BUN SERPL-MCNC: 9 MG/DL (ref 6–23)
CA-I BLDV-SCNC: 1.21 MMOL/L (ref 1.1–1.33)
CALCIUM SERPL-MCNC: 8.5 MG/DL (ref 8.6–10.3)
CHLORIDE BLDV-SCNC: 107 MMOL/L (ref 98–107)
CHLORIDE SERPL-SCNC: 109 MMOL/L (ref 98–107)
CLUE CELLS SPEC QL WET PREP: PRESENT
CO2 SERPL-SCNC: 22 MMOL/L (ref 21–32)
COLOR UR: YELLOW
CREAT SERPL-MCNC: 0.52 MG/DL (ref 0.5–1.05)
CRP SERPL-MCNC: 0.31 MG/DL
EGFRCR SERPLBLD CKD-EPI 2021: >90 ML/MIN/1.73M*2
EOSINOPHIL # BLD AUTO: 0.16 X10*3/UL (ref 0–0.7)
EOSINOPHIL NFR BLD AUTO: 1.6 %
ERYTHROCYTE [DISTWIDTH] IN BLOOD BY AUTOMATED COUNT: 14.9 % (ref 11.5–14.5)
GLUCOSE BLDV-MCNC: 77 MG/DL (ref 74–99)
GLUCOSE SERPL-MCNC: 71 MG/DL (ref 74–99)
GLUCOSE UR STRIP.AUTO-MCNC: NEGATIVE MG/DL
HCO3 BLDV-SCNC: 24.6 MMOL/L (ref 22–26)
HCT VFR BLD AUTO: 43 % (ref 36–46)
HCT VFR BLD EST: 44 % (ref 36–46)
HGB BLD-MCNC: 14.4 G/DL (ref 12–16)
HGB BLDV-MCNC: 14.8 G/DL (ref 12–16)
HOLD SPECIMEN: NORMAL
IMM GRANULOCYTES # BLD AUTO: 0.03 X10*3/UL (ref 0–0.7)
IMM GRANULOCYTES NFR BLD AUTO: 0.3 % (ref 0–0.9)
INHALED O2 CONCENTRATION: 21 %
KETONES UR STRIP.AUTO-MCNC: NEGATIVE MG/DL
LACTATE BLDV-SCNC: 0.8 MMOL/L (ref 0.4–2)
LEUKOCYTE ESTERASE UR QL STRIP.AUTO: ABNORMAL
LYMPHOCYTES # BLD AUTO: 4.41 X10*3/UL (ref 1.2–4.8)
LYMPHOCYTES NFR BLD AUTO: 44.5 %
MCH RBC QN AUTO: 29.9 PG (ref 26–34)
MCHC RBC AUTO-ENTMCNC: 33.5 G/DL (ref 32–36)
MCV RBC AUTO: 89 FL (ref 80–100)
MONOCYTES # BLD AUTO: 0.81 X10*3/UL (ref 0.1–1)
MONOCYTES NFR BLD AUTO: 8.2 %
MUCOUS THREADS #/AREA URNS AUTO: ABNORMAL /LPF
NEUTROPHILS # BLD AUTO: 4.46 X10*3/UL (ref 1.2–7.7)
NEUTROPHILS NFR BLD AUTO: 45 %
NITRITE UR QL STRIP.AUTO: POSITIVE
NRBC BLD-RTO: 0 /100 WBCS (ref 0–0)
OXYHGB MFR BLDV: 95 % (ref 45–75)
PCO2 BLDV: 38 MM HG (ref 41–51)
PH BLDV: 7.42 PH (ref 7.33–7.43)
PH UR STRIP.AUTO: 6 [PH]
PLATELET # BLD AUTO: 329 X10*3/UL (ref 150–450)
PO2 BLDV: 144 MM HG (ref 35–45)
POTASSIUM BLDV-SCNC: 4 MMOL/L (ref 3.5–5.3)
POTASSIUM SERPL-SCNC: 3.8 MMOL/L (ref 3.5–5.3)
PROT SERPL-MCNC: 6.4 G/DL (ref 6.4–8.2)
PROT UR STRIP.AUTO-MCNC: NEGATIVE MG/DL
RBC # BLD AUTO: 4.81 X10*6/UL (ref 4–5.2)
RBC # UR STRIP.AUTO: NEGATIVE /UL
RBC #/AREA URNS AUTO: ABNORMAL /HPF
SAO2 % BLDV: 100 % (ref 45–75)
SODIUM BLDV-SCNC: 137 MMOL/L (ref 136–145)
SODIUM SERPL-SCNC: 140 MMOL/L (ref 136–145)
SP GR UR STRIP.AUTO: 1.02
SQUAMOUS #/AREA URNS AUTO: ABNORMAL /HPF
T VAGINALIS SPEC QL WET PREP: ABNORMAL
TRICHOMONAS REFLEX COMMENT: ABNORMAL
UROBILINOGEN UR STRIP.AUTO-MCNC: 4 MG/DL
WBC # BLD AUTO: 9.9 X10*3/UL (ref 4.4–11.3)
WBC #/AREA URNS AUTO: >50 /HPF
WBC CLUMPS #/AREA URNS AUTO: ABNORMAL /HPF
WBC VAG QL WET PREP: ABNORMAL
YEAST VAG QL WET PREP: ABNORMAL

## 2024-02-24 PROCEDURE — 99284 EMERGENCY DEPT VISIT MOD MDM: CPT

## 2024-02-24 PROCEDURE — 81001 URINALYSIS AUTO W/SCOPE: CPT | Performed by: EMERGENCY MEDICINE

## 2024-02-24 PROCEDURE — 36415 COLL VENOUS BLD VENIPUNCTURE: CPT | Performed by: EMERGENCY MEDICINE

## 2024-02-24 PROCEDURE — 86140 C-REACTIVE PROTEIN: CPT | Performed by: EMERGENCY MEDICINE

## 2024-02-24 PROCEDURE — 85025 COMPLETE CBC W/AUTO DIFF WBC: CPT | Performed by: EMERGENCY MEDICINE

## 2024-02-24 PROCEDURE — 87210 SMEAR WET MOUNT SALINE/INK: CPT | Mod: 59 | Performed by: EMERGENCY MEDICINE

## 2024-02-24 PROCEDURE — 2500000004 HC RX 250 GENERAL PHARMACY W/ HCPCS (ALT 636 FOR OP/ED): Performed by: EMERGENCY MEDICINE

## 2024-02-24 PROCEDURE — 87040 BLOOD CULTURE FOR BACTERIA: CPT | Mod: 59,AHULAB | Performed by: EMERGENCY MEDICINE

## 2024-02-24 PROCEDURE — 84132 ASSAY OF SERUM POTASSIUM: CPT | Performed by: EMERGENCY MEDICINE

## 2024-02-24 PROCEDURE — 87086 URINE CULTURE/COLONY COUNT: CPT | Mod: AHULAB | Performed by: EMERGENCY MEDICINE

## 2024-02-24 PROCEDURE — 87661 TRICHOMONAS VAGINALIS AMPLIF: CPT | Mod: AHULAB | Performed by: EMERGENCY MEDICINE

## 2024-02-24 PROCEDURE — 96365 THER/PROPH/DIAG IV INF INIT: CPT

## 2024-02-24 PROCEDURE — 80076 HEPATIC FUNCTION PANEL: CPT | Performed by: EMERGENCY MEDICINE

## 2024-02-24 RX ORDER — METRONIDAZOLE 500 MG/1
500 TABLET ORAL 2 TIMES DAILY
Qty: 14 TABLET | Refills: 0 | Status: SHIPPED | OUTPATIENT
Start: 2024-02-24 | End: 2024-02-24 | Stop reason: SDUPTHER

## 2024-02-24 RX ORDER — METRONIDAZOLE 500 MG/1
500 TABLET ORAL 2 TIMES DAILY
Qty: 14 TABLET | Refills: 0 | Status: SHIPPED | OUTPATIENT
Start: 2024-02-24 | End: 2024-03-02

## 2024-02-24 RX ORDER — CIPROFLOXACIN 500 MG/1
500 TABLET ORAL 2 TIMES DAILY
Qty: 20 TABLET | Refills: 0 | Status: SHIPPED | OUTPATIENT
Start: 2024-02-24 | End: 2024-03-05

## 2024-02-24 RX ORDER — CIPROFLOXACIN 500 MG/1
500 TABLET ORAL 2 TIMES DAILY
Qty: 20 TABLET | Refills: 0 | Status: SHIPPED | OUTPATIENT
Start: 2024-02-24 | End: 2024-02-24 | Stop reason: SDUPTHER

## 2024-02-24 RX ADMIN — SODIUM CHLORIDE 1000 ML: 9 INJECTION, SOLUTION INTRAVENOUS at 13:18

## 2024-02-24 RX ADMIN — CEFTRIAXONE 2 G: 2 INJECTION, POWDER, FOR SOLUTION INTRAMUSCULAR; INTRAVENOUS at 13:19

## 2024-02-24 ASSESSMENT — PAIN DESCRIPTION - DESCRIPTORS: DESCRIPTORS: DULL;OTHER (COMMENT)

## 2024-02-24 ASSESSMENT — COLUMBIA-SUICIDE SEVERITY RATING SCALE - C-SSRS
1. IN THE PAST MONTH, HAVE YOU WISHED YOU WERE DEAD OR WISHED YOU COULD GO TO SLEEP AND NOT WAKE UP?: NO
6. HAVE YOU EVER DONE ANYTHING, STARTED TO DO ANYTHING, OR PREPARED TO DO ANYTHING TO END YOUR LIFE?: NO
2. HAVE YOU ACTUALLY HAD ANY THOUGHTS OF KILLING YOURSELF?: NO

## 2024-02-24 ASSESSMENT — PAIN SCALES - GENERAL
PAINLEVEL_OUTOF10: 8
PAINLEVEL_OUTOF10: 8

## 2024-02-24 ASSESSMENT — PAIN DESCRIPTION - ORIENTATION: ORIENTATION: LEFT

## 2024-02-24 ASSESSMENT — PAIN - FUNCTIONAL ASSESSMENT: PAIN_FUNCTIONAL_ASSESSMENT: 0-10

## 2024-02-24 ASSESSMENT — PAIN DESCRIPTION - PAIN TYPE: TYPE: ACUTE PAIN

## 2024-02-24 ASSESSMENT — PAIN DESCRIPTION - LOCATION: LOCATION: ARM

## 2024-02-24 NOTE — ED TRIAGE NOTES
Pt coming in today because she feels like she is having a UTI due to the increased need to urination. Has been having lower back with the increased urination, and burning when using the restroom. She is also complaining of left arm pain hat started 2 weeks ago. As well as high blood pressure and irregular heart rhythm.

## 2024-02-24 NOTE — ED PROVIDER NOTES
HPI   Chief Complaint   Patient presents with   • multiple medical complaints        HPI: []  49-year-old  female history hypertension comes in with a urinary symptoms.  She said for the last couple weeks she has had urinary frequency urgency.  She denies any fever or chills she did feel cold.  She has chronic back pain she is not sure about flank tenderness.  She denies nausea vomiting diarrhea constipation she denies hematuria she denies recent travel hospitalization or antibiotic use.  She denies any chest pain pressure heaviness or cough or congestion.    Past history: Hypertension recurrent UTIs  Social: Patient denies current tobacco alcohol drug abuse.  REVIEW OF SYSTEMS:    GENERAL.: No weight loss, fatigue, anorexia, insomnia, fever.    EYES: No vision loss, double vision, drainage, eye pain.    ENT: No pharyngitis, dry mouth.    CARDIOPULMONARY: No chest pain, palpitations, syncope, near syncope. No shortness of breath, cough, hemoptysis.    GI: No abdominal pain, change in bowel habits, melena, hematemesis, hematochezia, nausea, vomiting, diarrhea.    : No discharge, positive for dysuria, positive for frequency, positive for urgency, hematuria.    MS: No limb pain, joint pain, joint swelling.  Positive for back pain    SKIN: No rashes.    PSYCH: No depression, anxiety, suicidality, homicidality.    Review of systems is otherwise negative unless stated above or in history of present illness.  Social history, family history, allergies reviewed.  PHYSICAL EXAM:    GENERAL: Vitals noted, no distress. Alert and oriented  x 3. Non-toxic.      EENT: TMs clear. Posterior oropharynx unremarkable. No meningismus. No LAD.     NECK: Supple. Nontender. No midline tenderness.     CARDIAC: Regular, rate, rhythm. No murmurs rubs or gallops. No JVD    PULMONARY: Lungs clear bilaterally with good aeration. No wheezes rales or rhonchi. No respiratory distress.  No tachypnea stridor or retractions able to speak in  full sentences    ABDOMEN: Soft, nonsurgical. Nontender. No peritoneal signs. Normoactive bowel sounds. No pulsatile masses.  Questionable right CVA tenderness    EXTREMITIES: No peripheral edema. Negative Homans bilaterally, no cords.  2+ bounding pulses well-perfused    SKIN: No rash. Intact.     NEURO: No focal neurologic deficits, NIH score of 0. Cranial nerves normal as tested from II through XII.     MEDICAL DECISION MAKING:  CBC with differential chemistries LFTs unremarkable venous gas no hypercapnia normal lactate UA concerning UTI wet prep positive for clue cells.  Negative trichomonas.    Treatment in ED: IV established, urine sent for culture given IV fluids intravenous Rocephin 2 g.    ED course: Patient remained stable hemodynamic.    Impression: #1 acute pyelonephritis, #2 bacterial vaginosis    Plan set MDM: 49-year-old female history of recurrent UTIs comes in with right flank pain and urinary frequency urgency has no fever no leukocytosis present CVA tenderness concerning for evolving pyelonephritis low concern for kidney stone or septic shock or bacteremia patient to be discharged home with Cipro for 10 days, Flagyl for 7 days, close outpatient follow the primary doctor and strict return precaution.  Low concern for kidney stone.  Obstructive uropathy septic shock or gram-negative bacteremia.                          [place] Coma Scale Score: 15                     Patient History   Past Medical History:   Diagnosis Date   • Arthropathy, unspecified 10/07/2013    Arthropathy   • Personal history of other (healed) physical injury and trauma 06/19/2014    History of back injury   • Personal history of other diseases of the musculoskeletal system and connective tissue     History of osteoporosis   • Personal history of other diseases of the respiratory system     History of asthma   • Personal history of other diseases of urinary system     History of kidney disease   • Sprain of ligaments of lumbar  spine, initial encounter 10/07/2013    Lumbar sprain     Past Surgical History:   Procedure Laterality Date   •  SECTION, CLASSIC  2014     Section   • DILATION AND CURETTAGE OF UTERUS  2014    Dilation And Curettage   • HYSTERECTOMY  2014    Hysterectomy   • OTHER SURGICAL HISTORY  2019    Mid-urethral sling insertion   • OTHER SURGICAL HISTORY  2014    Nerve Block     No family history on file.  Social History     Tobacco Use   • Smoking status: Every Day     Packs/day: 2     Types: Cigarettes   • Smokeless tobacco: Never   Substance Use Topics   • Alcohol use: Yes     Comment: occ   • Drug use: Never       Physical Exam   ED Triage Vitals   Temperature Heart Rate Respirations BP   24 0748 24 0748 24 0748 24 0748   36.5 °C (97.7 °F) 54 16 (!) 145/99      Pulse Ox Temp Source Heart Rate Source Patient Position   24 0748 24 0748 24 0813 24 0813   98 % Temporal Monitor Sitting      BP Location FiO2 (%)     24 0813 --     Right arm        Physical Exam    ED Course & MDM   ED Course as of 24 1602   Sat 2024   1556 Patient CBC with differential and chemistries are unremarkable CRP is normal uric acid UTI venous gas no hypercapnia normal lactate, patient was given IV fluids intravenous Rocephin 2 g and will be discharged home with antibiotics. [MT]      ED Course User Index  [MT] Margaret Hernandez MD         Diagnoses as of 24 1602   Acute lower UTI   Pyelonephritis   Bacterial vaginosis       Medical Decision Making      Procedure  Procedures     Margaret Hernandez MD  24 1602

## 2024-02-26 LAB — T VAGINALIS RRNA SPEC QL NAA+PROBE: POSITIVE

## 2024-02-27 LAB — BACTERIA UR CULT: ABNORMAL

## 2024-02-28 ENCOUNTER — TELEPHONE (OUTPATIENT)
Dept: PHARMACY | Facility: HOSPITAL | Age: 49
End: 2024-02-28
Payer: MEDICARE

## 2024-02-28 LAB
BACTERIA BLD CULT: NORMAL
BACTERIA BLD CULT: NORMAL

## 2024-02-28 NOTE — PROGRESS NOTES
EDPD Note: Antibiotics Reviewed and Warranted    Contacted Mr./Mrs./Ms. Sonali Keen regarding a positive Trichomonas/BV culture/result that was taken during their recent emergency room visit. I completed education with patient . The patient is being treated appropriately with metronidazole 500 mg BID x 7 days.     No further follow up needed from EDPD Team.     Robert Fletcher, PharmD

## 2024-02-28 NOTE — PROGRESS NOTES
EDPD Note: Duration Adjust    Contacted Sonali Keen regarding positive trichomonas and urine culture/result that was taken during their recent emergency room visit. I completed education with  patient . The patient is being treated with the proper medication; however, the duration of the discharge prescription is incorrect. I gave verbal education about the new medication duration found below. No further follow up needed from EDPD Team.     The patient was discharged on Flagyl 500 mg BID x 7 days and Ciprofloxacin 500 mg BID x 10 days. This treatment is appropriate. Per guidelines, the Ciprofloxacin can be decreased from 10 days to 7 days.  On call, patient did report back pain and a frequent need to urinate. The patient was educated on steps to take if symptoms worsen over the course of therapy.         Robert Fletcher, PharmD

## 2024-05-02 ENCOUNTER — ANESTHESIA EVENT (OUTPATIENT)
Dept: GASTROENTEROLOGY | Facility: HOSPITAL | Age: 49
End: 2024-05-02
Payer: MEDICARE

## 2024-05-02 ENCOUNTER — ANESTHESIA (OUTPATIENT)
Dept: GASTROENTEROLOGY | Facility: HOSPITAL | Age: 49
End: 2024-05-02
Payer: MEDICARE

## 2024-05-02 ENCOUNTER — HOSPITAL ENCOUNTER (OUTPATIENT)
Dept: GASTROENTEROLOGY | Facility: HOSPITAL | Age: 49
Setting detail: OUTPATIENT SURGERY
Discharge: HOME | End: 2024-05-02
Payer: MEDICARE

## 2024-05-02 VITALS
WEIGHT: 200 LBS | RESPIRATION RATE: 18 BRPM | OXYGEN SATURATION: 96 % | DIASTOLIC BLOOD PRESSURE: 84 MMHG | TEMPERATURE: 97.2 F | HEART RATE: 64 BPM | HEIGHT: 66 IN | SYSTOLIC BLOOD PRESSURE: 131 MMHG | BODY MASS INDEX: 32.14 KG/M2

## 2024-05-02 DIAGNOSIS — R10.9 ABDOMINAL PAIN, UNSPECIFIED ABDOMINAL LOCATION: ICD-10-CM

## 2024-05-02 DIAGNOSIS — R15.9 INCONTINENCE OF FECES, UNSPECIFIED FECAL INCONTINENCE TYPE: ICD-10-CM

## 2024-05-02 DIAGNOSIS — Z12.11 COLON CANCER SCREENING: Primary | ICD-10-CM

## 2024-05-02 PROCEDURE — 3700000002 HC GENERAL ANESTHESIA TIME - EACH INCREMENTAL 1 MINUTE

## 2024-05-02 PROCEDURE — 3700000001 HC GENERAL ANESTHESIA TIME - INITIAL BASE CHARGE

## 2024-05-02 PROCEDURE — A45378 PR COLONOSCOPY,DIAGNOSTIC: Performed by: ANESTHESIOLOGY

## 2024-05-02 PROCEDURE — 2500000001 HC RX 250 WO HCPCS SELF ADMINISTERED DRUGS (ALT 637 FOR MEDICARE OP)

## 2024-05-02 PROCEDURE — 7100000010 HC PHASE TWO TIME - EACH INCREMENTAL 1 MINUTE

## 2024-05-02 PROCEDURE — 7100000009 HC PHASE TWO TIME - INITIAL BASE CHARGE

## 2024-05-02 PROCEDURE — 2500000005 HC RX 250 GENERAL PHARMACY W/O HCPCS

## 2024-05-02 PROCEDURE — 2500000004 HC RX 250 GENERAL PHARMACY W/ HCPCS (ALT 636 FOR OP/ED)

## 2024-05-02 PROCEDURE — A45378 PR COLONOSCOPY,DIAGNOSTIC

## 2024-05-02 PROCEDURE — 45378 DIAGNOSTIC COLONOSCOPY: CPT | Performed by: INTERNAL MEDICINE

## 2024-05-02 RX ORDER — PROPOFOL 10 MG/ML
INJECTION, EMULSION INTRAVENOUS AS NEEDED
Status: DISCONTINUED | OUTPATIENT
Start: 2024-05-02 | End: 2024-05-02

## 2024-05-02 RX ORDER — ONDANSETRON HYDROCHLORIDE 2 MG/ML
4 INJECTION, SOLUTION INTRAVENOUS ONCE AS NEEDED
Status: DISCONTINUED | OUTPATIENT
Start: 2024-05-02 | End: 2024-05-03 | Stop reason: HOSPADM

## 2024-05-02 RX ORDER — SODIUM CHLORIDE, SODIUM LACTATE, POTASSIUM CHLORIDE, CALCIUM CHLORIDE 600; 310; 30; 20 MG/100ML; MG/100ML; MG/100ML; MG/100ML
100 INJECTION, SOLUTION INTRAVENOUS CONTINUOUS
Status: DISCONTINUED | OUTPATIENT
Start: 2024-05-02 | End: 2024-05-03 | Stop reason: HOSPADM

## 2024-05-02 RX ORDER — ALBUTEROL SULFATE 90 UG/1
AEROSOL, METERED RESPIRATORY (INHALATION) AS NEEDED
Status: DISCONTINUED | OUTPATIENT
Start: 2024-05-02 | End: 2024-05-02

## 2024-05-02 RX ORDER — ONDANSETRON HYDROCHLORIDE 2 MG/ML
INJECTION, SOLUTION INTRAVENOUS AS NEEDED
Status: DISCONTINUED | OUTPATIENT
Start: 2024-05-02 | End: 2024-05-02

## 2024-05-02 RX ORDER — MIDAZOLAM HYDROCHLORIDE 1 MG/ML
INJECTION, SOLUTION INTRAMUSCULAR; INTRAVENOUS AS NEEDED
Status: DISCONTINUED | OUTPATIENT
Start: 2024-05-02 | End: 2024-05-02

## 2024-05-02 RX ORDER — LIDOCAINE HYDROCHLORIDE 20 MG/ML
INJECTION, SOLUTION INFILTRATION; PERINEURAL AS NEEDED
Status: DISCONTINUED | OUTPATIENT
Start: 2024-05-02 | End: 2024-05-02

## 2024-05-02 RX ADMIN — PROPOFOL 50 MG: 10 INJECTION, EMULSION INTRAVENOUS at 08:05

## 2024-05-02 RX ADMIN — PROPOFOL 30 MG: 10 INJECTION, EMULSION INTRAVENOUS at 08:19

## 2024-05-02 RX ADMIN — ALBUTEROL SULFATE 2 PUFF: 90 AEROSOL, METERED RESPIRATORY (INHALATION) at 07:26

## 2024-05-02 RX ADMIN — SODIUM CHLORIDE, SODIUM LACTATE, POTASSIUM CHLORIDE, AND CALCIUM CHLORIDE: 600; 310; 30; 20 INJECTION, SOLUTION INTRAVENOUS at 07:26

## 2024-05-02 RX ADMIN — ONDANSETRON 4 MG: 2 INJECTION, SOLUTION INTRAMUSCULAR; INTRAVENOUS at 08:08

## 2024-05-02 RX ADMIN — MIDAZOLAM 2 MG: 1 INJECTION INTRAMUSCULAR; INTRAVENOUS at 08:00

## 2024-05-02 RX ADMIN — PROPOFOL 250 MCG/KG/MIN: 10 INJECTION, EMULSION INTRAVENOUS at 08:04

## 2024-05-02 RX ADMIN — LIDOCAINE HYDROCHLORIDE 40 MG: 20 INJECTION, SOLUTION INFILTRATION; PERINEURAL at 08:06

## 2024-05-02 ASSESSMENT — PAIN SCALES - GENERAL
PAINLEVEL_OUTOF10: 0 - NO PAIN
PAINLEVEL_OUTOF10: 0 - NO PAIN
PAINLEVEL_OUTOF10: 7
PAINLEVEL_OUTOF10: 0 - NO PAIN

## 2024-05-02 ASSESSMENT — PAIN - FUNCTIONAL ASSESSMENT
PAIN_FUNCTIONAL_ASSESSMENT: 0-10

## 2024-05-02 ASSESSMENT — PAIN DESCRIPTION - DESCRIPTORS: DESCRIPTORS: BURNING

## 2024-05-02 NOTE — H&P
"-- DO NOT REPLY / DO NOT REPLY ALL --  -- Message is from the [facility]--    COVID-19 Universal Screening: N/A - Not about scheduling    General Patient Message      Reason for Call: Patients daughter states that Earnest Franco is continuing to call stating that they have not received signed paperwork from provider and it has been over 1 year since paperwork has been submitted. Please give a callback regarding this manner. Caller Information       Type Contact Phone    12/30/2020 10:40 AM CST Phone (Incoming) Helen Smith (Emergency Contact) [phone]          Alternative phone number: None     Turnaround time given to caller: ""This message will be sent to [facility] Provider's name]. The clinical team will fulfill your request as soon as they review your message. \""    " History Of Present Illness  Sonali Keen is a 49 y.o. female presenting with history of abdominal pain and fecal incontinence with prior colonoscopy 23 limited by poor preparation with two 3-4 mm descending colon hyperplastic polyps and a 5 mm sigmoid colon hyperplastic polyp here for repeat colonoscopy.     Past Medical History  Past Medical History:   Diagnosis Date    Arthropathy, unspecified 10/07/2013    Arthropathy    Personal history of other (healed) physical injury and trauma 2014    History of back injury    Personal history of other diseases of the musculoskeletal system and connective tissue     History of osteoporosis    Personal history of other diseases of the respiratory system     History of asthma    Personal history of other diseases of urinary system     History of kidney disease    Sprain of ligaments of lumbar spine, initial encounter 10/07/2013    Lumbar sprain     Surgical History  Past Surgical History:   Procedure Laterality Date     SECTION, CLASSIC  2014     Section    DILATION AND CURETTAGE OF UTERUS  2014    Dilation And Curettage    HYSTERECTOMY  2014    Hysterectomy    OTHER SURGICAL HISTORY  2019    Mid-urethral sling insertion    OTHER SURGICAL HISTORY  2014    Nerve Block     Social History  She reports that she has been smoking cigarettes. She has never used smokeless tobacco. She reports current alcohol use. She reports that she does not use drugs.    Family History  No family history on file.     Allergies  Allergies   Allergen Reactions    Bee Venom Protein (Honey Bee) Anaphylaxis    Metaxalone Hives    Penicillins Hives, Rash and Swelling    Tramadol Hives    Sulfamethoxazole-Trimethoprim Rash          Physical Exam  Constitutional:       Appearance: She is obese.   HENT:      Mouth/Throat:      Mouth: Mucous membranes are moist.   Eyes:      Conjunctiva/sclera: Conjunctivae normal.   Cardiovascular:      Rate and  Rhythm: Normal rate.   Pulmonary:      Effort: Pulmonary effort is normal.   Abdominal:      Palpations: Abdomen is soft.   Skin:     General: Skin is warm.   Neurological:      Mental Status: She is oriented to person, place, and time.   Psychiatric:         Mood and Affect: Mood normal.          Last Recorded Vitals  There were no vitals taken for this visit.    Assessment/Plan   history of abdominal pain and fecal incontinence with prior colonoscopy 12/22/23 limited by poor preparation with two 3-4 mm descending colon hyperplastic polyps and a 5 mm sigmoid colon hyperplastic polyp here for repeat colonoscopy     Hector Huerta MD

## 2024-05-02 NOTE — ANESTHESIA PREPROCEDURE EVALUATION
Patient: Sonali Keen    Procedure Information       Date/Time: 24 0810    Scheduled providers: Hector Huerta MD; Lidia Motta RN    Procedure: COLONOSCOPY    Location: [facility]            Relevant Problems   Pulmonary   (+) Moderate persistent asthma without complication (HHS-HCC)      /Renal   (+) Renal calculi      Musculoskeletal   (+) Fibromyalgia       Clinical information reviewed:                   NPO Detail:  No data recorded     There were no vitals filed for this visit.    Past Surgical History:   Procedure Laterality Date     SECTION, CLASSIC  2014     Section    DILATION AND CURETTAGE OF UTERUS  2014    Dilation And Curettage    HYSTERECTOMY  2014    Hysterectomy    OTHER SURGICAL HISTORY  2019    Mid-urethral sling insertion    OTHER SURGICAL HISTORY  2014    Nerve Block     Past Medical History:   Diagnosis Date    Arthropathy, unspecified 10/07/2013    Arthropathy    Personal history of other (healed) physical injury and trauma 2014    History of back injury    Personal history of other diseases of the musculoskeletal system and connective tissue     History of osteoporosis    Personal history of other diseases of the respiratory system     History of asthma    Personal history of other diseases of urinary system     History of kidney disease    Sprain of ligaments of lumbar spine, initial encounter 10/07/2013    Lumbar sprain       Current Outpatient Medications:     acetaminophen (Tylenol) 325 mg tablet, , Disp: , Rfl:     acetaminophen (Tylenol) 500 mg tablet, Take by mouth., Disp: , Rfl:     Advair Diskus 250-50 mcg/dose diskus inhaler, 1 INHALATION INHALED EVERY 12 HOURS, Disp: , Rfl:     albuterol (ProAir HFA) 90 mcg/actuation inhaler, Inhale., Disp: , Rfl:     bacitracin 500 unit/gram ointment, Apply topically 2 times a day., Disp: 14 g, Rfl: 0    EPINEPHrine 0.3 mg/0.3 mL injection syringe, 0.3  INTRAMUSCULAR ONCE AS NEEDED FOR ALLERGIC REACTION, Disp: , Rfl:     Gemtesa 75 mg tablet, Take 1 tablet (75 mg) by mouth once daily., Disp: , Rfl:     metoprolol succinate XL (Toprol-XL) 25 mg 24 hr tablet, , Disp: , Rfl:     metoprolol tartrate (Lopressor) 25 mg tablet, Take 1 tablet (25 mg) by mouth 2 times a day., Disp: , Rfl:     montelukast (Singulair) 10 mg tablet, TAKE 1 TABLET BY MOUTH IN THE EVENING ONCE A DAY 90, Disp: , Rfl:     Myrbetriq 50 mg tablet extended release 24 hr 24 hr tablet, Take 1 tablet (50 mg) by mouth once daily., Disp: , Rfl:     ondansetron ODT (Zofran-ODT) 4 mg disintegrating tablet, TAKE ONE TABLET UP TO THREE TIMES A DAY AS NEEDED FOR NAUSEA AND VOMITING, Disp: , Rfl:     pantoprazole (ProtoNix) 20 mg EC tablet, , Disp: , Rfl:     pantoprazole (ProtoNix) 40 mg EC tablet, Take 1 tablet (40 mg) by mouth once daily., Disp: , Rfl:   Prior to Admission medications    Medication Sig Start Date End Date Taking? Authorizing Provider   acetaminophen (Tylenol) 325 mg tablet     Historical Provider, MD   acetaminophen (Tylenol) 500 mg tablet Take by mouth. 8/12/22   Historical Provider, MD   Advair Diskus 250-50 mcg/dose diskus inhaler 1 INHALATION INHALED EVERY 12 HOURS    Historical Provider, MD   albuterol (ProAir HFA) 90 mcg/actuation inhaler Inhale. 12/17/12   Historical Provider, MD   bacitracin 500 unit/gram ointment Apply topically 2 times a day. 12/31/23   Carlita Nunez PA-C   EPINEPHrine 0.3 mg/0.3 mL injection syringe 0.3 INTRAMUSCULAR ONCE AS NEEDED FOR ALLERGIC REACTION 9/28/18   Historical Provider, MD   Gemtesa 75 mg tablet Take 1 tablet (75 mg) by mouth once daily. 10/4/23   Historical Provider, MD   metoprolol succinate XL (Toprol-XL) 25 mg 24 hr tablet     Historical Provider, MD   metoprolol tartrate (Lopressor) 25 mg tablet Take 1 tablet (25 mg) by mouth 2 times a day.    Historical Provider, MD   montelukast (Singulair) 10 mg tablet TAKE 1 TABLET BY MOUTH IN THE EVENING  ONCE A DAY 90    Historical Provider, MD   Myrbetriq 50 mg tablet extended release 24 hr 24 hr tablet Take 1 tablet (50 mg) by mouth once daily. 10/19/23   Historical Provider, MD   ondansetron ODT (Zofran-ODT) 4 mg disintegrating tablet TAKE ONE TABLET UP TO THREE TIMES A DAY AS NEEDED FOR NAUSEA AND VOMITING 5/10/23   Historical Provider, MD   pantoprazole (ProtoNix) 20 mg EC tablet     Historical Provider, MD   pantoprazole (ProtoNix) 40 mg EC tablet Take 1 tablet (40 mg) by mouth once daily.    Historical Provider, MD     Allergies   Allergen Reactions    Bee Venom Protein (Honey Bee) Anaphylaxis    Metaxalone Hives    Penicillins Hives, Rash and Swelling    Tramadol Hives    Sulfamethoxazole-Trimethoprim Rash     Social History     Tobacco Use    Smoking status: Every Day     Current packs/day: 2.00     Types: Cigarettes    Smokeless tobacco: Never   Substance Use Topics    Alcohol use: Yes     Comment: occ         Chemistry    Lab Results   Component Value Date/Time     02/24/2024 1318    K 3.8 02/24/2024 1318     (H) 02/24/2024 1318    CO2 22 02/24/2024 1318    BUN 9 02/24/2024 1318    CREATININE 0.52 02/24/2024 1318    Lab Results   Component Value Date/Time    CALCIUM 8.5 (L) 02/24/2024 1318    ALKPHOS 69 02/24/2024 1318    AST 10 02/24/2024 1318    ALT 10 02/24/2024 1318    BILITOT 0.4 02/24/2024 1318          Lab Results   Component Value Date/Time    WBC 9.9 02/24/2024 1318    HGB 14.4 02/24/2024 1318    HCT 43.0 02/24/2024 1318     02/24/2024 1318     Lab Results   Component Value Date/Time    PROTIME 14.1 (H) 07/27/2022 0421    INR 1.2 (H) 07/27/2022 0421       Physical Exam    Airway  Mallampati: I     Cardiovascular - normal exam     Dental        Pulmonary    Abdominal            Anesthesia Plan    History of general anesthesia?: yes  History of complications of general anesthesia?: no    ASA 2     MAC     Anesthetic plan and risks discussed with patient.    Plan discussed with  CAA and attending.

## 2024-05-02 NOTE — ANESTHESIA POSTPROCEDURE EVALUATION
Patient: Sonali Keen    Procedure Summary       Date: 05/02/24 Room / Location: [facility]    Anesthesia Start: 0800 Anesthesia Stop: 0845    Procedure: COLONOSCOPY Diagnosis:       Abdominal pain, unspecified abdominal location      Incontinence of feces, unspecified fecal incontinence type      Colon cancer screening    Scheduled Providers: Hector Huerta MD; Lidia Motta RN; Tonya Steel MD Responsible Provider: Tonya Steel MD    Anesthesia Type: MAC ASA Status: 2            Anesthesia Type: MAC    Vitals Value Taken Time   /87 05/02/24 0845   Temp 97.2 05/02/24 0845   Pulse 65 05/02/24 0845   Resp 19 05/02/24 0845   SpO2 100 05/02/24 0845       Anesthesia Post Evaluation    Patient location during evaluation: bedside  Patient participation: complete - patient participated  Level of consciousness: awake and alert  Pain management: adequate  Airway patency: patent  Cardiovascular status: acceptable  Respiratory status: acceptable and face mask  Hydration status: acceptable  Postoperative Nausea and Vomiting: none        No notable events documented.

## 2024-05-02 NOTE — DISCHARGE INSTRUCTIONS

## 2024-05-22 ENCOUNTER — APPOINTMENT (OUTPATIENT)
Dept: RADIOLOGY | Facility: HOSPITAL | Age: 49
End: 2024-05-22
Payer: MEDICARE

## 2024-05-22 ENCOUNTER — HOSPITAL ENCOUNTER (EMERGENCY)
Facility: HOSPITAL | Age: 49
Discharge: HOME | End: 2024-05-22
Payer: MEDICARE

## 2024-05-22 VITALS
OXYGEN SATURATION: 100 % | RESPIRATION RATE: 18 BRPM | SYSTOLIC BLOOD PRESSURE: 138 MMHG | WEIGHT: 195 LBS | TEMPERATURE: 97.7 F | HEART RATE: 60 BPM | BODY MASS INDEX: 32.49 KG/M2 | DIASTOLIC BLOOD PRESSURE: 90 MMHG | HEIGHT: 65 IN

## 2024-05-22 DIAGNOSIS — Z20.2 POSSIBLE EXPOSURE TO STD: ICD-10-CM

## 2024-05-22 DIAGNOSIS — K08.89 PAIN, DENTAL: Primary | ICD-10-CM

## 2024-05-22 DIAGNOSIS — N30.00 ACUTE CYSTITIS WITHOUT HEMATURIA: ICD-10-CM

## 2024-05-22 LAB
ALBUMIN SERPL BCP-MCNC: 4.2 G/DL (ref 3.4–5)
ALP SERPL-CCNC: 70 U/L (ref 33–110)
ALT SERPL W P-5'-P-CCNC: 10 U/L (ref 7–45)
ANION GAP SERPL CALC-SCNC: 11 MMOL/L (ref 10–20)
APPEARANCE UR: ABNORMAL
AST SERPL W P-5'-P-CCNC: 12 U/L (ref 9–39)
BACTERIA #/AREA URNS AUTO: ABNORMAL /HPF
BASOPHILS # BLD AUTO: 0.04 X10*3/UL (ref 0–0.1)
BASOPHILS NFR BLD AUTO: 0.5 %
BILIRUB SERPL-MCNC: 0.5 MG/DL (ref 0–1.2)
BILIRUB UR STRIP.AUTO-MCNC: NEGATIVE MG/DL
BUN SERPL-MCNC: 11 MG/DL (ref 6–23)
CALCIUM SERPL-MCNC: 9 MG/DL (ref 8.6–10.3)
CHLORIDE SERPL-SCNC: 107 MMOL/L (ref 98–107)
CLUE CELLS SPEC QL WET PREP: NORMAL
CO2 SERPL-SCNC: 24 MMOL/L (ref 21–32)
COLOR UR: YELLOW
CREAT SERPL-MCNC: 0.52 MG/DL (ref 0.5–1.05)
EGFRCR SERPLBLD CKD-EPI 2021: >90 ML/MIN/1.73M*2
EOSINOPHIL # BLD AUTO: 0.16 X10*3/UL (ref 0–0.7)
EOSINOPHIL NFR BLD AUTO: 1.9 %
ERYTHROCYTE [DISTWIDTH] IN BLOOD BY AUTOMATED COUNT: 14.1 % (ref 11.5–14.5)
GLUCOSE SERPL-MCNC: 85 MG/DL (ref 74–99)
GLUCOSE UR STRIP.AUTO-MCNC: NORMAL MG/DL
HCT VFR BLD AUTO: 46.3 % (ref 36–46)
HGB BLD-MCNC: 15 G/DL (ref 12–16)
HIV 1+2 AB+HIV1 P24 AG SERPL QL IA: NONREACTIVE
IMM GRANULOCYTES # BLD AUTO: 0.01 X10*3/UL (ref 0–0.7)
IMM GRANULOCYTES NFR BLD AUTO: 0.1 % (ref 0–0.9)
KETONES UR STRIP.AUTO-MCNC: ABNORMAL MG/DL
LEUKOCYTE ESTERASE UR QL STRIP.AUTO: ABNORMAL
LIPASE SERPL-CCNC: 100 U/L (ref 9–82)
LYMPHOCYTES # BLD AUTO: 3.35 X10*3/UL (ref 1.2–4.8)
LYMPHOCYTES NFR BLD AUTO: 40.3 %
MCH RBC QN AUTO: 29.1 PG (ref 26–34)
MCHC RBC AUTO-ENTMCNC: 32.4 G/DL (ref 32–36)
MCV RBC AUTO: 90 FL (ref 80–100)
MONOCYTES # BLD AUTO: 0.57 X10*3/UL (ref 0.1–1)
MONOCYTES NFR BLD AUTO: 6.9 %
MUCOUS THREADS #/AREA URNS AUTO: ABNORMAL /LPF
NEUTROPHILS # BLD AUTO: 4.18 X10*3/UL (ref 1.2–7.7)
NEUTROPHILS NFR BLD AUTO: 50.3 %
NITRITE UR QL STRIP.AUTO: NEGATIVE
NRBC BLD-RTO: 0 /100 WBCS (ref 0–0)
PH UR STRIP.AUTO: 5.5 [PH]
PLATELET # BLD AUTO: 331 X10*3/UL (ref 150–450)
POTASSIUM SERPL-SCNC: 3.5 MMOL/L (ref 3.5–5.3)
PROT SERPL-MCNC: 7.2 G/DL (ref 6.4–8.2)
PROT UR STRIP.AUTO-MCNC: ABNORMAL MG/DL
RBC # BLD AUTO: 5.16 X10*6/UL (ref 4–5.2)
RBC # UR STRIP.AUTO: ABNORMAL /UL
RBC #/AREA URNS AUTO: ABNORMAL /HPF
SODIUM SERPL-SCNC: 138 MMOL/L (ref 136–145)
SP GR UR STRIP.AUTO: 1.03
SQUAMOUS #/AREA URNS AUTO: ABNORMAL /HPF
T VAGINALIS SPEC QL WET PREP: NORMAL
TREPONEMA PALLIDUM IGG+IGM AB [PRESENCE] IN SERUM OR PLASMA BY IMMUNOASSAY: NONREACTIVE
UROBILINOGEN UR STRIP.AUTO-MCNC: NORMAL MG/DL
WBC # BLD AUTO: 8.3 X10*3/UL (ref 4.4–11.3)
WBC #/AREA URNS AUTO: ABNORMAL /HPF
WBC VAG QL WET PREP: NORMAL
YEAST VAG QL WET PREP: NORMAL

## 2024-05-22 PROCEDURE — 80053 COMPREHEN METABOLIC PANEL: CPT | Performed by: PHYSICIAN ASSISTANT

## 2024-05-22 PROCEDURE — 85025 COMPLETE CBC W/AUTO DIFF WBC: CPT | Performed by: PHYSICIAN ASSISTANT

## 2024-05-22 PROCEDURE — 86780 TREPONEMA PALLIDUM: CPT | Mod: AHULAB | Performed by: PHYSICIAN ASSISTANT

## 2024-05-22 PROCEDURE — 96374 THER/PROPH/DIAG INJ IV PUSH: CPT | Mod: 59

## 2024-05-22 PROCEDURE — 2550000001 HC RX 255 CONTRASTS: Performed by: PHYSICIAN ASSISTANT

## 2024-05-22 PROCEDURE — 87491 CHLMYD TRACH DNA AMP PROBE: CPT | Mod: AHULAB | Performed by: PHYSICIAN ASSISTANT

## 2024-05-22 PROCEDURE — 87389 HIV-1 AG W/HIV-1&-2 AB AG IA: CPT | Mod: AHULAB | Performed by: PHYSICIAN ASSISTANT

## 2024-05-22 PROCEDURE — 74177 CT ABD & PELVIS W/CONTRAST: CPT

## 2024-05-22 PROCEDURE — 96361 HYDRATE IV INFUSION ADD-ON: CPT

## 2024-05-22 PROCEDURE — 2500000004 HC RX 250 GENERAL PHARMACY W/ HCPCS (ALT 636 FOR OP/ED)

## 2024-05-22 PROCEDURE — 74177 CT ABD & PELVIS W/CONTRAST: CPT | Performed by: RADIOLOGY

## 2024-05-22 PROCEDURE — 2500000004 HC RX 250 GENERAL PHARMACY W/ HCPCS (ALT 636 FOR OP/ED): Performed by: PHYSICIAN ASSISTANT

## 2024-05-22 PROCEDURE — 87210 SMEAR WET MOUNT SALINE/INK: CPT | Performed by: PHYSICIAN ASSISTANT

## 2024-05-22 PROCEDURE — 99284 EMERGENCY DEPT VISIT MOD MDM: CPT | Mod: 25

## 2024-05-22 PROCEDURE — 83690 ASSAY OF LIPASE: CPT | Performed by: PHYSICIAN ASSISTANT

## 2024-05-22 PROCEDURE — 81001 URINALYSIS AUTO W/SCOPE: CPT | Performed by: PHYSICIAN ASSISTANT

## 2024-05-22 PROCEDURE — 87086 URINE CULTURE/COLONY COUNT: CPT | Mod: AHULAB | Performed by: PHYSICIAN ASSISTANT

## 2024-05-22 PROCEDURE — 36415 COLL VENOUS BLD VENIPUNCTURE: CPT | Performed by: PHYSICIAN ASSISTANT

## 2024-05-22 RX ORDER — ACETAMINOPHEN 500 MG
1000 TABLET ORAL EVERY 8 HOURS PRN
Qty: 42 TABLET | Refills: 0 | Status: SHIPPED | OUTPATIENT
Start: 2024-05-22 | End: 2024-05-29

## 2024-05-22 RX ORDER — IBUPROFEN 600 MG/1
600 TABLET ORAL EVERY 6 HOURS PRN
Qty: 20 TABLET | Refills: 0 | Status: SHIPPED | OUTPATIENT
Start: 2024-05-22 | End: 2024-05-27

## 2024-05-22 RX ORDER — ONDANSETRON HYDROCHLORIDE 2 MG/ML
INJECTION, SOLUTION INTRAVENOUS
Status: COMPLETED
Start: 2024-05-22 | End: 2024-05-22

## 2024-05-22 RX ORDER — METRONIDAZOLE 500 MG/1
500 TABLET ORAL 2 TIMES DAILY
Qty: 20 TABLET | Refills: 0 | Status: SHIPPED | OUTPATIENT
Start: 2024-05-22 | End: 2024-06-01

## 2024-05-22 RX ORDER — ONDANSETRON 4 MG/1
4 TABLET, ORALLY DISINTEGRATING ORAL EVERY 8 HOURS PRN
Qty: 9 TABLET | Refills: 0 | Status: SHIPPED | OUTPATIENT
Start: 2024-05-22 | End: 2024-05-25

## 2024-05-22 RX ORDER — CEFUROXIME AXETIL 500 MG/1
500 TABLET ORAL 2 TIMES DAILY
Qty: 20 TABLET | Refills: 0 | Status: SHIPPED | OUTPATIENT
Start: 2024-05-22 | End: 2024-06-01

## 2024-05-22 RX ORDER — ACETAMINOPHEN 325 MG/1
975 TABLET ORAL ONCE
Status: COMPLETED | OUTPATIENT
Start: 2024-05-22 | End: 2024-05-22

## 2024-05-22 RX ORDER — ONDANSETRON HYDROCHLORIDE 2 MG/ML
4 INJECTION, SOLUTION INTRAVENOUS ONCE
Status: COMPLETED | OUTPATIENT
Start: 2024-05-22 | End: 2024-05-22

## 2024-05-22 RX ADMIN — ONDANSETRON 4 MG: 2 INJECTION INTRAMUSCULAR; INTRAVENOUS at 11:14

## 2024-05-22 RX ADMIN — SODIUM CHLORIDE 1000 ML: 9 INJECTION, SOLUTION INTRAVENOUS at 08:45

## 2024-05-22 RX ADMIN — ACETAMINOPHEN 975 MG: 325 TABLET ORAL at 10:45

## 2024-05-22 RX ADMIN — IOHEXOL 75 ML: 350 INJECTION, SOLUTION INTRAVENOUS at 11:33

## 2024-05-22 RX ADMIN — ONDANSETRON HYDROCHLORIDE 4 MG: 2 INJECTION, SOLUTION INTRAVENOUS at 11:14

## 2024-05-22 ASSESSMENT — PAIN - FUNCTIONAL ASSESSMENT: PAIN_FUNCTIONAL_ASSESSMENT: 0-10

## 2024-05-22 ASSESSMENT — COLUMBIA-SUICIDE SEVERITY RATING SCALE - C-SSRS
6. HAVE YOU EVER DONE ANYTHING, STARTED TO DO ANYTHING, OR PREPARED TO DO ANYTHING TO END YOUR LIFE?: NO
1. IN THE PAST MONTH, HAVE YOU WISHED YOU WERE DEAD OR WISHED YOU COULD GO TO SLEEP AND NOT WAKE UP?: NO
2. HAVE YOU ACTUALLY HAD ANY THOUGHTS OF KILLING YOURSELF?: NO

## 2024-05-22 ASSESSMENT — PAIN SCALES - GENERAL: PAINLEVEL_OUTOF10: 1

## 2024-05-22 NOTE — Clinical Note
Sonali Keen was seen and treated in our emergency department on 5/22/2024.  She may return to work on 05/23/2024.       If you have any questions or concerns, please don't hesitate to call.      Lizbeth Damon PA-C

## 2024-05-22 NOTE — ED TRIAGE NOTES
Pt arrived to ED with c/o urinary frequency x4 days. Pt denies burning with urination. Pt alert and oriented x3. Ambulated to triage. Hx HTN did not take morning medication.

## 2024-05-22 NOTE — ED PROVIDER NOTES
"HPI     CC: Urinary Frequency     HPI: Sonali Keen is a 49 y.o. female with past medical history of fibromyalgia, asthma, hypertension, GERD, chronic back pain, and osteoarthritis presents with concern for recurrent urinary tract infection amongst other complaints.  Patient reports that over the past 4 days, she has been experiencing urinary urgency occurring about every 30 minutes.  Denies hematuria or vaginal discharge.  States that she is prone to UTIs and feels that this is \"an infection in her bloodstream.\"  She denies fevers and chills but does report occasional nausea and headache.  She also reports acute abdominal pain with normal bowel movements occurring yesterday.  Secondary complaint is a chipped tooth with pain.  She feels that this started about the same time.  She has not made an appointment with a dentist.  She denies difficulty eating at this time.  Has not taken anything for pain.  She denies chest pain or shortness of breath.  As I am about to leave the room, she also asks if an STD could cause her urinary symptoms even though she had denied vaginal discharge prior.  She did discuss that she would like to be tested despite no acute symptoms or confirmation from partners.    ROS: 10-point review of systems was performed and is otherwise negative except as noted in HPI.      Past Medical History: Noncontributory except per HPI     Past Surgical History: Noncontributory except per HPI     Family History: Reviewed and noncontributory     Social History:  Noncontributory except per HPI       Allergies   Allergen Reactions    Bee Venom Protein (Honey Bee) Anaphylaxis    Metaxalone Hives    Penicillins Hives, Rash and Swelling    Tramadol Hives    Shellfish Derived Hives    Sulfamethoxazole-Trimethoprim Rash       Home Meds:   Current Outpatient Medications   Medication Instructions    acetaminophen (TYLENOL) 1,000 mg, oral, Every 8 hours PRN    Advair Diskus 250-50 mcg/dose diskus inhaler 1 " "INHALATION INHALED EVERY 12 HOURS    albuterol (ProAir HFA) 90 mcg/actuation inhaler inhalation    bacitracin 500 unit/gram ointment Topical, 2 times daily    cefuroxime (CEFTIN) 500 mg, oral, 2 times daily    EPINEPHrine 0.3 mg/0.3 mL injection syringe 0.3 INTRAMUSCULAR ONCE AS NEEDED FOR ALLERGIC REACTION    Gemtesa 75 mg tablet 1 tablet, oral, Daily    ibuprofen 600 mg, oral, Every 6 hours PRN    metoprolol succinate XL (Toprol-XL) 25 mg 24 hr tablet     metoprolol tartrate (LOPRESSOR) 25 mg, oral, 2 times daily    metroNIDAZOLE (FLAGYL) 500 mg, oral, 2 times daily    montelukast (Singulair) 10 mg tablet TAKE 1 TABLET BY MOUTH IN THE EVENING ONCE A DAY 90    Myrbetriq 50 mg, oral, Daily    ondansetron ODT (ZOFRAN-ODT) 4 mg, oral, Every 8 hours PRN    pantoprazole (ProtoNix) 20 mg EC tablet     pantoprazole (PROTONIX) 40 mg, oral, Daily        ED Triage Vitals [05/22/24 0725]   Temperature Heart Rate Respirations BP   36.5 °C (97.7 °F) 98 18 (!) 164/101      Pulse Ox Temp Source Heart Rate Source Patient Position   100 % Temporal Monitor --      BP Location FiO2 (%)     -- --         Heart Rate:  [58-98]   Temperature:  [36.5 °C (97.7 °F)]   Respirations:  [18]   BP: (138-164)/()   Height:  [165.1 cm (5' 5\")]   Weight:  [88.5 kg (195 lb)]   Pulse Ox:  [98 %-100 %]      Physical Exam:  Physical Exam  Constitutional:       General: She is not in acute distress.     Appearance: Normal appearance. She is not ill-appearing.   HENT:      Head: Normocephalic and atraumatic.      Right Ear: External ear normal.      Left Ear: External ear normal.      Nose: Nose normal.      Mouth/Throat:      Mouth: Mucous membranes are moist.        Comments: Missing tooth in the above-noted area with concern for underlying abscess.  No facial tenderness or surrounding erythema.  No swelling in the oropharynx.  Eyes:      Extraocular Movements: Extraocular movements intact.      Conjunctiva/sclera: Conjunctivae normal.      " Pupils: Pupils are equal, round, and reactive to light.   Cardiovascular:      Rate and Rhythm: Normal rate and regular rhythm.      Pulses: Normal pulses.   Pulmonary:      Effort: Pulmonary effort is normal. No respiratory distress.      Breath sounds: Normal breath sounds. No wheezing.   Abdominal:      General: Abdomen is flat.      Palpations: Abdomen is soft.      Tenderness: There is abdominal tenderness. There is right CVA tenderness.      Comments: Right lower quadrant tenderness without rebound tenderness or guarding   Musculoskeletal:         General: Normal range of motion.      Cervical back: Normal range of motion and neck supple.   Skin:     General: Skin is warm and dry.      Capillary Refill: Capillary refill takes less than 2 seconds.   Neurological:      General: No focal deficit present.      Mental Status: She is alert and oriented to person, place, and time.   Psychiatric:         Mood and Affect: Mood normal.          Diagnostic Results        Labs Reviewed   URINALYSIS WITH REFLEX CULTURE AND MICROSCOPIC - Abnormal       Result Value    Color, Urine Yellow      Appearance, Urine Turbid (*)     Specific Gravity, Urine 1.029      pH, Urine 5.5      Protein, Urine 10 (TRACE)      Glucose, Urine Normal      Blood, Urine 0.03 (TRACE) (*)     Ketones, Urine TRACE (*)     Bilirubin, Urine NEGATIVE      Urobilinogen, Urine Normal      Nitrite, Urine NEGATIVE      Leukocyte Esterase, Urine 75 Parish/µL (*)    CBC WITH AUTO DIFFERENTIAL - Abnormal    WBC 8.3      nRBC 0.0      RBC 5.16      Hemoglobin 15.0      Hematocrit 46.3 (*)     MCV 90      MCH 29.1      MCHC 32.4      RDW 14.1      Platelets 331      Neutrophils % 50.3      Immature Granulocytes %, Automated 0.1      Lymphocytes % 40.3      Monocytes % 6.9      Eosinophils % 1.9      Basophils % 0.5      Neutrophils Absolute 4.18      Immature Granulocytes Absolute, Automated 0.01      Lymphocytes Absolute 3.35      Monocytes Absolute 0.57       Eosinophils Absolute 0.16      Basophils Absolute 0.04     LIPASE - Abnormal    Lipase 100 (*)     Narrative:     Venipuncture immediately after or during the administration of Metamizole may lead to falsely low results. Testing should be performed immediately prior to Metamizole dosing.   MICROSCOPIC ONLY, URINE - Abnormal    WBC, Urine 11-20 (*)     RBC, Urine 1-2      Squamous Epithelial Cells, Urine 10-25 (FEW)      Bacteria, Urine 1+ (*)     Mucus, Urine FEW     COMPREHENSIVE METABOLIC PANEL - Normal    Glucose 85      Sodium 138      Potassium 3.5      Chloride 107      Bicarbonate 24      Anion Gap 11      Urea Nitrogen 11      Creatinine 0.52      eGFR >90      Calcium 9.0      Albumin 4.2      Alkaline Phosphatase 70      Total Protein 7.2      AST 12      Bilirubin, Total 0.5      ALT 10     SYPHILIS SCREENING WITH REFLEX - Normal    Syphilis Total Ab Nonreactive     HIV 1/2 ANTIGEN/ANTIBODY SCREEN WIH REFLEX TO CONFIRMATION - Normal    HIV 1/2 Antigen/Antibody Screen with Reflex to Confirmation Nonreactive      Narrative:     HIV Ag/Ab screen is performed using the Siem[employer] HIV Ag/Ab Combo assay which detects the presence of HIV p24 antigen as well as antibodies to HIV-1 (Group M and O) and HIV-2.  No laboratory evidence of HIV infection. If acute HIV infection is suspected, consider testing for HIV RNA by PCR (viral load).   URINE CULTURE   WET PREP, GENITAL    Trichomonas None Seen      Clue Cells None Seen      Yeast None Seen      WBC 1-2     URINALYSIS WITH REFLEX CULTURE AND MICROSCOPIC    Narrative:     The following orders were created for panel order Urinalysis with Reflex Culture and Microscopic.  Procedure                               Abnormality         Status                     ---------                               -----------         ------                     Urinalysis with Reflex C...[[id number]]  Abnormal            Final result               Extra Urine Gray Tube[[id number]]                                                          Please view results for these tests on the individual orders.   EXTRA URINE GRAY TUBE   C. TRACHOMATIS + N. GONORRHOEAE, AMPLIFIED         CT abdomen pelvis w IV contrast   Final Result   No acute abnormality in the abdomen or pelvis.        Nonobstructive bilateral renal calculi. Small focus of scarring in   the right kidney.        Small hiatal hernia.        Signed by: Maira Pillai 5/22/2024 12:26 PM   Dictation workstation:   [id number]                    No data recorded                Procedure  Procedures    ED Course & MDM   Assessment/Plan:     Medications   sodium chloride 0.9 % bolus 1,000 mL (0 mL intravenous Stopped 5/22/24 0945)   acetaminophen (Tylenol) tablet 975 mg (975 mg oral Given 5/22/24 1045)   iohexol (OMNIPaque) 350 mg iodine/mL solution 75 mL (75 mL intravenous Given 5/22/24 1133)   ondansetron (Zofran) injection 4 mg (4 mg intravenous Given 5/22/24 1114)        Diagnoses as of 05/22/24 1852   Pain, dental   Acute cystitis without hematuria   Possible exposure to STD       Medical Decision Making    Sonali Keen is a 49 y.o. female with past medical history of fibromyalgia, asthma, hypertension, GERD, chronic back pain, and osteoarthritis presents with concern for recurrent urinary tract infection, STDs, and dental infection.  Patient is nontoxic-appearing and her vital signs are normal.  Based on her physical exam and presentation, differential diagnosis includes STD, dental abscess, dental carry, appendicitis, renal calculi, UTI, pyelonephritis, pancreatitis, GERD flare.  Will obtain STD testing via self swabs, HIV and syphilis testing given outbreaks of syphilis, CBC with differential, CMP, lipase, urinalysis with culture, and CT abdomen and pelvis with IV contrast.  Will give the patient 1 L of normal saline.  Patient declined pretreating for STDs.  Upon chart review, in February, patient did have trichomonas and BV.  Her  urine at that time also grew Klebsiella pneumoniae which was resistant to ampicillin.  Wet prep negative.  Lipase elevated at 100.  CMP normal.  Urinalysis with 75 leukocyte esterase and 11-20 white blood cells with 1+ bacteria.  CBC normal.  Patient was given Tylenol for headache.  Syphilis and HIV nonreactive.  Wet prep normal.  Lipase mildly elevated at 100.  CMP normal.  Urinalysis with 75 leukocyte Estrace, 11-20 white blood cells, and 1+ bacteria.  CBC normal.  CT abdomen and pelvis showed no acute findings.  Nonobstructive bilateral renal calculi and scarring in the right kidney as well as hiatal hernia reviewed with patient.  Given that the patient shows no signs of pancreatitis and this is not where her pain is located, do not feel that this is related to the above.  No further workup indicated for acute abdominal pain.  Dental pain to be treated as an outpatient.    UTI: Patient was educated about the test results.  We discussed that this will be sent for culture and that a post discharge nurse will call her with the final results to determine whether or not antibiotics are appropriate.  We discussed that antibiotics have been sent to the pharmacy.  Patient was specifically given cefuroxime and Flagyl to cover both UTI and dental pain per pharmacy and based on her allergy list.  Recommended taking this in its entirety unless called by the above nurse.  We discussed reasons to return to the emergency department which can include but are not limited to new fever, chills, abdominal pain, back pain, nausea, vomiting, or worsening symptoms.  Patient agreeable to plan of care and felt comfortable returning home.   Patient educated about the test results.  We discussed that they will not all return today and that they will be called by the postdischarge nurse with the final results.  She was not pretreated at this time in the emergency department.  We discussed about refraining from sexual activity over the next  7 days unless test results come back negative.  We discussed to follow-up with primary care physician should symptoms worsen.  Recommended returning to the ER for any new or worsening symptoms including but not limited to fever, chills, worsening discharge, nausea, or vomiting.  Patient was agreeable to this plan of care and felt comfortable returning home.  Dental pain - I discussed the diagnosis of dental pain with the patient. We discussed the need for seeing a dentist as soon as possible for definitive management. I have prescribed Ibuprofen for pain control. I have given the above antibiotics for the above various reasons.  The patient agrees to see a dentist. Advised to return to the ED should they experience new or worsening symptoms including but not limited to fever, chills, worsening pain, drainage from tooth, or any new symptoms. Patient was agreeable to discharge home.     Disposition: Home    ED Prescriptions       Medication Sig Dispense Start Date End Date Auth. Provider    cefuroxime (Ceftin) 500 mg tablet Take 1 tablet (500 mg) by mouth 2 times a day for 10 days. 20 tablet 5/22/2024 6/1/2024 Lizbethgrecia Hozone, PA-C    metroNIDAZOLE (Flagyl) 500 mg tablet Take 1 tablet (500 mg) by mouth 2 times a day for 10 days. 20 tablet 5/22/2024 6/1/2024 Lizbeth F Pinzone, PA-C    acetaminophen (Tylenol) 500 mg tablet Take 2 tablets (1,000 mg) by mouth every 8 hours if needed for mild pain (1 - 3) for up to 7 days. 42 tablet 5/22/2024 5/29/2024 Lizbeth F Pinzone, PA-C    ibuprofen 600 mg tablet Take 1 tablet (600 mg) by mouth every 6 hours if needed for moderate pain (4 - 6) for up to 5 days. 20 tablet 5/22/2024 5/27/2024 Lizbeth F Pinzone, PA-C    ondansetron ODT (Zofran-ODT) 4 mg disintegrating tablet Take 1 tablet (4 mg) by mouth every 8 hours if needed for nausea or vomiting for up to 3 days. 9 tablet 5/22/2024 5/25/2024 Lizbeth F Pinzone, PA-C            Social Determinants Affecting Care: none     Lizbeth Damon  JAYSON    This note was dictated by speech recognition. Minor errors in transcription may be present.     Lizbeth Damon PA-C  05/22/24 3626

## 2024-05-23 LAB
BACTERIA UR CULT: NORMAL
C TRACH RRNA SPEC QL NAA+PROBE: NEGATIVE
N GONORRHOEA DNA SPEC QL PROBE+SIG AMP: NEGATIVE

## 2024-05-23 RX ORDER — TAMSULOSIN HYDROCHLORIDE 0.4 MG/1
0.4 CAPSULE ORAL DAILY
Qty: 24 CAPSULE | Refills: 6 | OUTPATIENT
Start: 2024-05-23

## 2024-06-05 ENCOUNTER — OFFICE VISIT (OUTPATIENT)
Dept: RHEUMATOLOGY | Facility: CLINIC | Age: 49
End: 2024-06-05
Payer: MEDICARE

## 2024-06-05 VITALS
HEIGHT: 65 IN | HEART RATE: 76 BPM | WEIGHT: 200 LBS | BODY MASS INDEX: 33.32 KG/M2 | DIASTOLIC BLOOD PRESSURE: 78 MMHG | SYSTOLIC BLOOD PRESSURE: 116 MMHG | TEMPERATURE: 97 F

## 2024-06-05 DIAGNOSIS — M19.90 ARTHRITIS: ICD-10-CM

## 2024-06-05 DIAGNOSIS — M79.7 FIBROMYALGIA: Primary | ICD-10-CM

## 2024-06-05 PROCEDURE — 99213 OFFICE O/P EST LOW 20 MIN: CPT | Performed by: INTERNAL MEDICINE

## 2024-06-05 RX ORDER — DULOXETIN HYDROCHLORIDE 20 MG/1
20 CAPSULE, DELAYED RELEASE ORAL DAILY
Qty: 30 CAPSULE | Refills: 7 | Status: SHIPPED | OUTPATIENT
Start: 2024-06-05 | End: 2025-06-05

## 2024-06-05 ASSESSMENT — PAIN SCALES - GENERAL: PAINLEVEL: 8

## 2024-06-05 NOTE — PROGRESS NOTES
She has has increased pain in the shoulders, (L)>(R).   She also notes pain in the lower back.  The pain in her shoulders and lower back improved temporarily with soaking in a Jacuzzi.  She has improvement in urinary incontinence with taking Myrbetriq.  She notes significant stiffness in her shoulders and lower back.    Examination is remarkable for tenderness in the lateral aspect of the shoulders, left more than right.  There is good passive range of motion of both shoulders without joint effusion.  There is pain in the left lateral neck and left shoulder with right lateral flexion of the neck.  There are no joint effusions.  Muscle strength is preserved without evidence of muscle atrophy in either upper extremity.    Laboratory (5/22/2024) BUN 11, creatinine 0.52, glucose 85, alkaline phosphatase 70, ALT 10, AST 12, lipase 100, WBC 8.3, hemoglobin 15.0, hematocrit 46.3, MCV 90, MCHC 32.4, platelets 331, urinalysis trace protein, trace blood, trace ketones, leukocyte esterase 75, bacteria 1+, WBC 11-20, RBC 1-2.    CT scan abdomen and pelvis (5/22/2024) no acute abnormality in the abdomen or pelvis.  Nonobstructing bilateral renal calculi.  Small focal scarring in the right kidney.  Small hiatus hernia.    She has cervical and lumbar spondylosis, fibromyalgia, bilateral nephrolithiasis, elevated serum lipase, history of high titer positive KASIA with negative MARIELLE panel,osteoarthritis of the knees.    She is to start cyclobenzaprine 20 mg daily.  She is to do stretching exercises to the neck and shoulders.  She is to continue doing back exercises.  She is to return at the next available office appointment.

## 2024-06-07 RX ORDER — TAMSULOSIN HYDROCHLORIDE 0.4 MG/1
0.4 CAPSULE ORAL DAILY
Qty: 24 CAPSULE | Refills: 6 | OUTPATIENT
Start: 2024-06-07

## 2024-06-26 ENCOUNTER — TELEPHONE (OUTPATIENT)
Dept: RHEUMATOLOGY | Facility: CLINIC | Age: 49
End: 2024-06-26
Payer: MEDICARE

## 2024-06-26 NOTE — TELEPHONE ENCOUNTER
Patient said her cymbalta is making her feel sick and she needs to speak with someone to discuss symptoms and alternatives. Please advise.

## 2024-06-27 NOTE — TELEPHONE ENCOUNTER
Return call placed to patient to discuss reactions experienced to medication. Pt states she has been taking prescribed Cymbalta for fibro for the past 3 weeks and that she is experiencing worsening symptoms to include stomach cramping, diarrhea, headache, nausea and dry mouth. Pt states she is sleeping all day and rates overall symptoms 8/10, stating she thought she would have to go to the emergency soon. This nurse to consult with provider and place follow up call to patient outlining new instruction, or changes to treatment plan.

## 2024-07-25 ENCOUNTER — LAB REQUISITION (OUTPATIENT)
Dept: LAB | Facility: HOSPITAL | Age: 49
End: 2024-07-25
Payer: MEDICARE

## 2024-07-25 DIAGNOSIS — R82.90 UNSPECIFIED ABNORMAL FINDINGS IN URINE: ICD-10-CM

## 2024-07-25 PROCEDURE — 87086 URINE CULTURE/COLONY COUNT: CPT

## 2024-07-25 PROCEDURE — 87186 SC STD MICRODIL/AGAR DIL: CPT

## 2024-07-28 LAB — BACTERIA UR CULT: ABNORMAL

## 2024-07-29 ENCOUNTER — APPOINTMENT (OUTPATIENT)
Dept: OBSTETRICS AND GYNECOLOGY | Facility: CLINIC | Age: 49
End: 2024-07-29
Payer: MEDICARE

## 2024-08-28 ENCOUNTER — HOSPITAL ENCOUNTER (EMERGENCY)
Facility: HOSPITAL | Age: 49
Discharge: HOME | End: 2024-08-28
Payer: COMMERCIAL

## 2024-08-28 VITALS
BODY MASS INDEX: 31.7 KG/M2 | HEIGHT: 66 IN | SYSTOLIC BLOOD PRESSURE: 168 MMHG | RESPIRATION RATE: 147 BRPM | HEART RATE: 75 BPM | TEMPERATURE: 97.4 F | OXYGEN SATURATION: 99 % | DIASTOLIC BLOOD PRESSURE: 89 MMHG | WEIGHT: 197.23 LBS
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
## 2024-08-28 PROCEDURE — 2500000004 HC RX 250 GENERAL PHARMACY W/ HCPCS (ALT 636 FOR OP/ED): Performed by: PHYSICIAN ASSISTANT

## 2024-08-28 PROCEDURE — 99283 EMERGENCY DEPT VISIT LOW MDM: CPT

## 2024-08-28 PROCEDURE — 2500000001 HC RX 250 WO HCPCS SELF ADMINISTERED DRUGS (ALT 637 FOR MEDICARE OP): Performed by: PHYSICIAN ASSISTANT

## 2024-08-28 RX ORDER — HYDROXYZINE HYDROCHLORIDE 25 MG/1
25 TABLET, FILM COATED ORAL EVERY 6 HOURS PRN
Qty: 12 TABLET | Refills: 0 | Status: SHIPPED | OUTPATIENT
Start: 2024-08-28 | End: 2024-08-31

## 2024-08-28 RX ORDER — PREDNISONE 20 MG/1
40 TABLET ORAL ONCE
Status: COMPLETED | OUTPATIENT
Start: 2024-08-28 | End: 2024-08-28

## 2024-08-28 RX ORDER — PREDNISONE 20 MG/1
20 TABLET ORAL 2 TIMES DAILY
Qty: 8 TABLET | Refills: 0 | Status: SHIPPED | OUTPATIENT
Start: 2024-08-28 | End: 2024-09-01

## 2024-08-28 RX ORDER — HYDROXYZINE HYDROCHLORIDE 25 MG/1
25 TABLET, FILM COATED ORAL ONCE
Status: COMPLETED | OUTPATIENT
Start: 2024-08-28 | End: 2024-08-28

## 2024-08-28 RX ORDER — TRIAMCINOLONE ACETONIDE 1 MG/G
CREAM TOPICAL 2 TIMES DAILY
Qty: 15 G | Refills: 0 | Status: SHIPPED | OUTPATIENT
Start: 2024-08-28

## 2024-08-28 ASSESSMENT — COLUMBIA-SUICIDE SEVERITY RATING SCALE - C-SSRS
1. IN THE PAST MONTH, HAVE YOU WISHED YOU WERE DEAD OR WISHED YOU COULD GO TO SLEEP AND NOT WAKE UP?: NO
2. HAVE YOU ACTUALLY HAD ANY THOUGHTS OF KILLING YOURSELF?: NO
6. HAVE YOU EVER DONE ANYTHING, STARTED TO DO ANYTHING, OR PREPARED TO DO ANYTHING TO END YOUR LIFE?: NO

## 2024-08-28 ASSESSMENT — PAIN - FUNCTIONAL ASSESSMENT: PAIN_FUNCTIONAL_ASSESSMENT: 0-10

## 2024-08-28 ASSESSMENT — PAIN SCALES - GENERAL: PAINLEVEL_OUTOF10: 3

## 2024-08-29 NOTE — ED PROVIDER NOTES
HPI   Chief Complaint   Patient presents with    Insect Bite       This is a 49-year-old female concerned about insect bites she developed some on her side and back so symptoms over the past few days.  She is concerned about a spider however she has not seen 1.  And the area is very pruritic despite taking Benadryl.              Patient History   Past Medical History:   Diagnosis Date    Arthropathy, unspecified 10/07/2013    Arthropathy    Personal history of other (healed) physical injury and trauma 2014    History of back injury    Personal history of other diseases of the musculoskeletal system and connective tissue     History of osteoporosis    Personal history of other diseases of the respiratory system     History of asthma    Personal history of other diseases of urinary system     History of kidney disease    Sprain of ligaments of lumbar spine, initial encounter 10/07/2013    Lumbar sprain     Past Surgical History:   Procedure Laterality Date     SECTION, CLASSIC  2014     Section    DILATION AND CURETTAGE OF UTERUS  2014    Dilation And Curettage    HYSTERECTOMY  2014    Hysterectomy    OTHER SURGICAL HISTORY  2019    Mid-urethral sling insertion    OTHER SURGICAL HISTORY  2014    Nerve Block     No family history on file.  Social History     Tobacco Use    Smoking status: Every Day     Current packs/day: 2.00     Types: Cigarettes    Smokeless tobacco: Never   Substance Use Topics    Alcohol use: Yes     Comment: occ    Drug use: Never       Physical Exam   ED Triage Vitals [24 2150]   Temperature Heart Rate Respirations BP   36.3 °C (97.4 °F) 74 18 (!) 176/102      Pulse Ox Temp Source Heart Rate Source Patient Position   100 % Temporal Monitor --      BP Location FiO2 (%)     -- --       Physical Exam  Vitals and nursing note reviewed.   Constitutional:       General: She is not in acute distress.     Appearance: She is well-developed.   HENT:       Head: Normocephalic and atraumatic.      Right Ear: Tympanic membrane normal.      Left Ear: Tympanic membrane normal.      Nose: No congestion.      Mouth/Throat:      Mouth: Mucous membranes are moist.   Eyes:      Conjunctiva/sclera: Conjunctivae normal.   Cardiovascular:      Rate and Rhythm: Normal rate and regular rhythm.      Heart sounds: No murmur heard.  Pulmonary:      Effort: Pulmonary effort is normal. No respiratory distress.      Breath sounds: Normal breath sounds.   Abdominal:      Palpations: Abdomen is soft.      Tenderness: There is no abdominal tenderness.   Musculoskeletal:         General: No swelling or tenderness.      Cervical back: Neck supple.   Skin:     General: Skin is warm and dry.      Capillary Refill: Capillary refill takes less than 2 seconds.      Findings: Rash present. No bruising or erythema. Rash is scaling, urticarial and vesicular. Rash is not crusting, macular, nodular, papular, purpuric or pustular.             Comments: No purpura no SJS   Neurological:      General: No focal deficit present.      Mental Status: She is alert and oriented to person, place, and time.      Cranial Nerves: No cranial nerve deficit.      Sensory: Sensory deficit present.      Motor: No weakness.   Psychiatric:         Mood and Affect: Mood normal.         Behavior: Behavior normal.           ED Course & MDM   Diagnoses as of 08/29/24 0439   Insect bite, unspecified site, initial encounter                 No data recorded     [place] Coma Scale Score: 15 (08/28/24 2223 : Maria Antonia Pal RN)                           Medical Decision Making  Differential diagnosis is insect bite versus spider versus contact dermatitis    Will treat with steroids and hydroxyzine patient to follow-up return for any worse or concerning symptoms        Procedure  Procedures     Chidi Arndt PA-C  08/29/24 9848

## 2024-10-28 DIAGNOSIS — I10 PRIMARY HYPERTENSION: ICD-10-CM

## 2024-10-28 DIAGNOSIS — K21.9 GASTROESOPHAGEAL REFLUX DISEASE WITHOUT ESOPHAGITIS: Primary | ICD-10-CM

## 2024-10-28 RX ORDER — PANTOPRAZOLE SODIUM 40 MG/1
40 TABLET, DELAYED RELEASE ORAL DAILY
Qty: 90 TABLET | Refills: 3 | Status: SHIPPED | OUTPATIENT
Start: 2024-10-28

## 2024-10-28 RX ORDER — METOPROLOL TARTRATE 25 MG/1
25 TABLET, FILM COATED ORAL 2 TIMES DAILY
Qty: 180 TABLET | Refills: 3 | Status: SHIPPED | OUTPATIENT
Start: 2024-10-28

## 2024-12-04 ENCOUNTER — APPOINTMENT (OUTPATIENT)
Dept: RHEUMATOLOGY | Facility: CLINIC | Age: 49
End: 2024-12-04
Payer: MEDICARE

## 2024-12-24 DIAGNOSIS — M79.7 FIBROMYALGIA: ICD-10-CM

## 2024-12-26 RX ORDER — DULOXETIN HYDROCHLORIDE 20 MG/1
20 CAPSULE, DELAYED RELEASE ORAL DAILY
Qty: 30 CAPSULE | Refills: 7 | Status: SHIPPED | OUTPATIENT
Start: 2024-12-26 | End: 2025-12-26

## 2025-07-29 ENCOUNTER — APPOINTMENT (OUTPATIENT)
Dept: RHEUMATOLOGY | Facility: CLINIC | Age: 50
End: 2025-07-29
Payer: MEDICARE

## 2025-07-29 ENCOUNTER — OFFICE VISIT (OUTPATIENT)
Dept: RHEUMATOLOGY | Facility: CLINIC | Age: 50
End: 2025-07-29
Payer: MEDICARE

## 2025-07-29 ENCOUNTER — HOSPITAL ENCOUNTER (OUTPATIENT)
Dept: RADIOLOGY | Facility: CLINIC | Age: 50
Discharge: HOME | End: 2025-07-29
Payer: MEDICARE

## 2025-07-29 VITALS
SYSTOLIC BLOOD PRESSURE: 142 MMHG | WEIGHT: 207.2 LBS | HEART RATE: 85 BPM | HEIGHT: 66 IN | DIASTOLIC BLOOD PRESSURE: 89 MMHG | TEMPERATURE: 98.7 F | BODY MASS INDEX: 33.3 KG/M2

## 2025-07-29 DIAGNOSIS — M13.0 POLYARTHRITIS: Primary | ICD-10-CM

## 2025-07-29 DIAGNOSIS — M13.0 POLYARTHRITIS: ICD-10-CM

## 2025-07-29 DIAGNOSIS — R74.8 ELEVATED LIPASE: ICD-10-CM

## 2025-07-29 DIAGNOSIS — R76.8 POSITIVE ANA (ANTINUCLEAR ANTIBODY): ICD-10-CM

## 2025-07-29 DIAGNOSIS — M35.00 SICCA, UNSPECIFIED TYPE (MULTI): ICD-10-CM

## 2025-07-29 DIAGNOSIS — M79.7 FIBROMYALGIA: ICD-10-CM

## 2025-07-29 PROCEDURE — 73562 X-RAY EXAM OF KNEE 3: CPT | Mod: 50

## 2025-07-29 RX ORDER — IBUPROFEN 400 MG/1
400 TABLET, FILM COATED ORAL EVERY 8 HOURS PRN
Qty: 90 TABLET | Refills: 1 | Status: SHIPPED | OUTPATIENT
Start: 2025-07-29

## 2025-07-29 RX ORDER — IBUPROFEN 400 MG/1
400 TABLET, FILM COATED ORAL EVERY 8 HOURS PRN
Qty: 90 TABLET | Refills: 1 | Status: SHIPPED | OUTPATIENT
Start: 2025-07-29 | End: 2025-07-29

## 2025-07-29 ASSESSMENT — PAIN SCALES - GENERAL: PAINLEVEL_OUTOF10: 10-WORST PAIN EVER

## 2025-07-29 ASSESSMENT — ENCOUNTER SYMPTOMS
LOSS OF SENSATION IN FEET: 0
DEPRESSION: 1
OCCASIONAL FEELINGS OF UNSTEADINESS: 0

## 2025-07-29 ASSESSMENT — PATIENT HEALTH QUESTIONNAIRE - PHQ9
SUM OF ALL RESPONSES TO PHQ9 QUESTIONS 1 AND 2: 1
2. FEELING DOWN, DEPRESSED OR HOPELESS: SEVERAL DAYS
1. LITTLE INTEREST OR PLEASURE IN DOING THINGS: NOT AT ALL

## 2025-07-29 NOTE — PROGRESS NOTES
Subjective    Last seen 6/2024  Has been having recurrent UTIs since last visit. Having some oral and vaginal dryness.  Has been having some abdominal pain, seems to be related to bowel movements. Has loose bowel movements. Certain foods trigger frequent bowel movements.  Sometimes her hands and ankles get swollen.  Having right knee pain that started yesterday.    Objective    Vitals:    07/29/25 1307   BP: 142/89   Pulse: 85   Temp: 37.1 °C (98.7 °F)      Examination is remarkable for mild warmth and effusion in the right knee compared to the left.   here is good passive range of motion of both shoulders without joint effusion. Lungs are clear to auscultation bilaterally. The heart has regular rate and rhythm, no murmurs. Muscle strength is preserved without evidence of muscle atrophy in either upper extremity.     LABS:  Lab Results   Component Value Date    WBC 8.3 05/22/2024    HGB 15.0 05/22/2024    HCT 46.3 (H) 05/22/2024    MCV 90 05/22/2024     05/22/2024      Lab Results   Component Value Date    GLUCOSE 85 05/22/2024    CO2 24 05/22/2024    BUN 11 05/22/2024    EGFR >90 05/22/2024    CALCIUM 9.0 05/22/2024    ALBUMIN 4.2 05/22/2024      Lab Results   Component Value Date    CRP 0.31 02/24/2024    CRP 0.32 12/21/2023    CRP 0.31 08/12/2022    CRP 2.11 (A) 07/17/2022     Lab Results   Component Value Date    SEDRATE 21 (H) 07/17/2022    SEDRATE 19 08/07/2019     Lab Results   Component Value Date    C3 130 12/21/2023    C4 30 12/21/2023     Lab Results   Component Value Date    RF <10 12/21/2023    CITAB <1 12/21/2023     Lab Results   Component Value Date    ANATITER >1:2560 12/21/2023    ARNP 0.3 12/21/2023    ASMRN <0.2 12/21/2023    ASSA <0.2 12/21/2023    ASSB <0.2 12/21/2023    ASCL <0.2 12/21/2023    JO1 <0.2 12/21/2023    ACHR <0.2 12/21/2023    ACEN <0.2 12/21/2023    DNADS <1.0 12/21/2023    ANCPA None Detected 12/21/2023    ANCTI <1:20 12/21/2023    PR3 0 12/21/2023    MPO 0 12/21/2023  "    Lab Results   Component Value Date    PROTUR 10 (TRACE) 05/22/2024    GLUCOSEU Normal 05/22/2024    BLOODU 0.03 (TRACE) (A) 05/22/2024    KETONESU TRACE (A) 05/22/2024    NITRITEU NEGATIVE 05/22/2024    LEUKOCYTESU 75 Parish/µL (A) 05/22/2024     No results found for: \"UTPCR\"     Lab Results   Component Value Date    URICACID 4.2 12/21/2023     Lab Results   Component Value Date    COLORFL Orange 07/17/2022    CLARITYFLUID Cloudy 07/17/2022    WBCFL 13,130 07/17/2022    NEUTROBFREL 94 07/17/2022    LYMPHSBFREL 8 01/23/2022    RBCFL 15,000 07/17/2022    CRYSFL SEE BELOW 07/17/2022       IMAGING:  === 05/22/24 ===    CT ABDOMEN PELVIS W IV CONTRAST    - Impression -  No acute abnormality in the abdomen or pelvis.    Nonobstructive bilateral renal calculi. Small focus of scarring in  the right kidney.    Small hiatal hernia.    Signed by: Maira Pillai 5/22/2024 12:26 PM  Dictation workstation:   [id number]      === 06/25/23 ===    CT ABDOMEN PELVIS W IV CONTRAST    - Impression -  No evidence of acute abdominal or pelvic pathology.    Small renal calculi. No hydronephrosis.    Patient is post hysterectomy.    Additional findings as discussed above.      I personally reviewed the images/study and I agree with radiology  resident Dr. Leia Hahn's findings as stated. This study was  interpreted at [facility],  [place], [place].      === 10/05/22 ===    CT ABDOMEN PELVIS WO IV CONTRAST    - Impression -  Punctate bilateral nonobstructive renal calculi. No  hydroureteronephrosis or bladder calculi.    Submucosal fat deposition within the colon likely related to remote  inflammation. No definite CT evidence of acute inflammation. Moderate  stool burden.  I personally reviewed the images/study and I agree with the findings  as stated.      === 07/27/22 ===    CT CHEST PULMONARY EMBOLISM W IV CONTRAST    - Impression -  1.  No evidence of pulmonary emboli to the distal segmental " level.  2. Trace bilateral pleural effusions and suggestion of mild pulmonary  venous congestion. Correlation with fluid status is recommended.  Small hiatal hernia.      === 04/27/22 ===    CT CERVICAL SPINE WO IV CONTRAST    - Impression -  No acute fracture or traumatic subluxation of the cervical spine.    Multilevel discogenic degenerative changes as described above.      === 02/22/22 ===    CT ABDOMEN PELVIS W IV CONTRAST    - Impression -  1.  Punctate nonobstructing bilateral renal calculi without evidence  of pyelonephritis.      === 11/14/21 ===    CT ABDOMEN PELVIS W IV CONTRAST    - Impression -  Nonobstructing stones in the kidneys bilaterally measure up to 3 mm.    There is concentric bladder wall thickening. Findings may be related  to incomplete distention with cystitis not excluded. Correlate with  urinalysis.    Prominence of the submucosal fat throughout the colon. Findings may  be seen with chronic inflammatory processes.      === 08/28/21 ===    CT ABDOMEN PELVIS WO IV CONTRAST    - Impression -  Redemonstrated bilateral nonobstructing renal calculi. Evaluation of  ureters for calculi is partially limited however no evidence of  hydroureteronephrosis.    Additional findings as detailed.      === 05/12/21 ===    CT ABDOMEN PELVIS WO IV CONTRAST    - Impression -  1. No acute abdominal or pelvic process.  2. Nonobstructing bilateral renal calculi measuring up to 0.4 cm.      === 05/07/21 ===    CT CERVICAL SPINE WO IV CONTRAST    - Impression -  1.  No acute finding.      === 01/09/21 ===    CT ABDOMEN PELVIS WO IV CONTRAST    - Impression -  1. No acute abdominal or pelvic process.  2. Bilateral nonobstructing renal calculi measuring up to 0.3 cm.      === 01/05/21 ===    CT ABDOMEN PELVIS WO IV CONTRAST    - Impression -  Recommend fasting gallbladder ultrasound, concern for sludge/small  gallstones.    Prominent pancreatic body and tail with very mild very pancreatic fat  stranding with few  small subcentimeter peripancreatic lymph nodes  present. Correlate with laboratory findings and recommend follow-up  with MRI abdomen/pelvis with contrast    Stable appearance bilateral kidneys with nonobstructive bilateral  renal lithiasis. Again there are few small calcifications along the  right psoas muscle-redemonstrated    Submucosal fat involving colon, possible history of colitis.    Decompressed stomach contained debris.    Air within lower uterine segment, which may relate to recent  intervention versus infectious/inflammatory process.      === 05/27/20 ===    CT ABDOMEN PELVIS WO CONTRAST    - Impression -  Prior hysterectomy.    Several small nonobstructing stones in both kidneys. No  hydronephrosis on either side. No ureteral stone or dilatation on  either side.    Small hiatal hernia.      === 08/07/19 ===    CT HEAD WO CONTRAST    - Impression -  No acute intracranial abnormality.      === 05/29/19 ===    CT ABDOMEN PELVIS WO CONTRAST    - Impression -  Bilateral renal calculi measuring less than 5 mm in diameter. There  are no findings of obstructive uropathy.    No evidence of appendicitis.    Mild colonic constipation.    The remainder of the abdomen and pelvis is unremarkable allowing for  limitations of an unenhanced study.      === 11/16/15 ===    CT ABDOMEN PELVIS W CONTRAST    - Impression -  1. No acute CT pathology to explain patient's abdominal pain,  2. Nonobstructing calculi within the bilateral renal collecting  systems      I personally reviewed the study and resident interpretation. I agree  with the findings as stated.      === 10/05/12 ===    CT ABDOMEN PELVIS WO CONTRAST      === 07/16/12 ===    CT chest for PE    - Impression -  .  Likely minimal bibasilar atelectasis or scarring. Bibasilar tiny  infiltrates /infection are lesser likely possibility.    No definite evidence of pulmonary embolism.    Tiny hiatal hernia.  .  This report was dictated at [facility].      === 02/17/12 ===    CT ABDOMEN PELVIS WO CONTRAST    - Impression -  1. 5 mm partially obstructing stone in the midportion of the left  ureter. Proximal left hydroureter and hydronephrosis.  2. Multiple renal calculi bilaterally.  3. Questionable stone within the right ureter. CT urogram may be  obtained if clinically warranted.    Assessment/Plan    She has cervical and lumbar spondylosis, fibromyalgia, bilateral nephrolithiasis, elevated serum lipase, history of high titer positive KASIA with negative MARIELLE panel, osteoarthritis of the knees.    Right knee aspiration was performed today, 2.5 ml blood-tinged synovial fluid obtained. Knee aspirate was examined under microscope. 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    She is to obtain updated lab work with KASIA, ESR, CRP, CMP, lipase, immunoglobulins, IgG subclasses. She is to obtain updated knee X-rays. She is to use ibuprofen 400 mg TID as needed. She is to return at the next available office appointment.    Patient seen and discussed with Dr. Wheeler.    Dorita Flores MD  Rheumatology Fellow, PGY-5      Arthrocentesis    Date/Time: 7/29/2025 1:29 PM    Performed by: Kilo Wheeler MD  Authorized by: Kilo Wheeler MD    Consent:     Consent obtained:  Verbal    Consent given by:  Patient    Risks, benefits, and alternatives were discussed: yes      Risks discussed:  Bleeding, pain and infection    Alternatives discussed:  No treatment  Universal protocol:     Patient identity confirmed:  Verbally with patient  Location:     Location:  Knee    Knee:  R knee  Anesthesia:     Anesthesia method:  Topical application  Procedure details:     Needle gauge:  20 G    Ultrasound guidance: no      Approach:  Medial    Aspirate characteristics:  Blood-tinged  Post-procedure details:     Dressing:  Adhesive bandage    Procedure completion:  Tolerated well, no immediate complications

## 2025-08-01 LAB
ALBUMIN SERPL-MCNC: 4.4 G/DL (ref 3.6–5.1)
ALP SERPL-CCNC: 83 U/L (ref 37–153)
ALT SERPL-CCNC: 20 U/L (ref 6–29)
ANA PAT SER IF-IMP: ABNORMAL
ANA PAT SER IF-IMP: ABNORMAL
ANA SER QL IF: POSITIVE
ANA TITR SER IF: ABNORMAL TITER
ANA TITR SER IF: ABNORMAL TITER
ANION GAP SERPL CALCULATED.4IONS-SCNC: 8 MMOL/L (CALC) (ref 7–17)
AST SERPL-CCNC: 14 U/L (ref 10–35)
BASOPHILS # BLD AUTO: 50 CELLS/UL (ref 0–200)
BASOPHILS NFR BLD AUTO: 0.5 %
BILIRUB SERPL-MCNC: 0.5 MG/DL (ref 0.2–1.2)
BUN SERPL-MCNC: 9 MG/DL (ref 7–25)
CALCIUM SERPL-MCNC: 9.6 MG/DL (ref 8.6–10.4)
CENTROMERE B AB SER-ACNC: ABNORMAL AI
CHLORIDE SERPL-SCNC: 107 MMOL/L (ref 98–110)
CO2 SERPL-SCNC: 26 MMOL/L (ref 20–32)
CREAT SERPL-MCNC: 0.7 MG/DL (ref 0.5–1.03)
CRP SERPL-MCNC: <3 MG/L
DSDNA AB SER-ACNC: <1 IU/ML
EGFRCR SERPLBLD CKD-EPI 2021: 105 ML/MIN/1.73M2
ENA JO1 AB SER IA-ACNC: ABNORMAL AI
ENA RNP AB SER-ACNC: ABNORMAL AI
ENA SCL70 AB SER IA-ACNC: ABNORMAL AI
ENA SM AB SER IA-ACNC: ABNORMAL AI
ENA SM+RNP AB SER IA-ACNC: ABNORMAL AI
ENA SS-A AB SER IA-ACNC: ABNORMAL AI
ENA SS-B AB SER IA-ACNC: ABNORMAL AI
EOSINOPHIL # BLD AUTO: 230 CELLS/UL (ref 15–500)
EOSINOPHIL NFR BLD AUTO: 2.3 %
ERYTHROCYTE [DISTWIDTH] IN BLOOD BY AUTOMATED COUNT: 13.8 % (ref 11–15)
ERYTHROCYTE [SEDIMENTATION RATE] IN BLOOD BY WESTERGREN METHOD: 6 MM/H
GLUCOSE SERPL-MCNC: 115 MG/DL (ref 65–99)
HCT VFR BLD AUTO: 47.6 % (ref 35–45)
HGB BLD-MCNC: 15.4 G/DL (ref 11.7–15.5)
IGA SERPL-MCNC: 159 MG/DL (ref 47–310)
IGG SERPL-MCNC: 1336 MG/DL (ref 600–1640)
IGG SERPL-MCNC: 1531 MG/DL (ref 600–1640)
IGG1 SER-MCNC: 851 MG/DL (ref 382–929)
IGG2 SER-MCNC: 342 MG/DL (ref 241–700)
IGG3 SER-MCNC: 98 MG/DL (ref 22–178)
IGG4 SER-MCNC: 6.5 MG/DL (ref 4–86)
IGM SERPL-MCNC: 118 MG/DL (ref 50–300)
LIPASE SERPL-CCNC: 115 U/L (ref 7–60)
LYMPHOCYTES # BLD AUTO: 4600 CELLS/UL (ref 850–3900)
LYMPHOCYTES NFR BLD AUTO: 46 %
MCH RBC QN AUTO: 30 PG (ref 27–33)
MCHC RBC AUTO-ENTMCNC: 32.4 G/DL (ref 32–36)
MCV RBC AUTO: 92.8 FL (ref 80–100)
MONOCYTES # BLD AUTO: 790 CELLS/UL (ref 200–950)
MONOCYTES NFR BLD AUTO: 7.9 %
NEUTROPHILS # BLD AUTO: 4330 CELLS/UL (ref 1500–7800)
NEUTROPHILS NFR BLD AUTO: 43.3 %
NUCLEOSOME AB SER IA-ACNC: ABNORMAL AI
PLATELET # BLD AUTO: 305 THOUSAND/UL (ref 140–400)
PMV BLD REES-ECKER: 11 FL (ref 7.5–12.5)
POTASSIUM SERPL-SCNC: 3.7 MMOL/L (ref 3.5–5.3)
PROT SERPL-MCNC: 7.4 G/DL (ref 6.1–8.1)
RBC # BLD AUTO: 5.13 MILLION/UL (ref 3.8–5.1)
RIBOSOMAL P AB SER-ACNC: ABNORMAL AI
SODIUM SERPL-SCNC: 141 MMOL/L (ref 135–146)
WBC # BLD AUTO: 10 THOUSAND/UL (ref 3.8–10.8)

## 2025-08-06 DIAGNOSIS — M79.7 FIBROMYALGIA: ICD-10-CM

## 2025-08-06 DIAGNOSIS — M13.0 POLYARTHRITIS: ICD-10-CM

## 2025-08-06 DIAGNOSIS — R76.8 POSITIVE ANA (ANTINUCLEAR ANTIBODY): ICD-10-CM

## 2025-08-06 DIAGNOSIS — M35.00 SICCA, UNSPECIFIED TYPE (MULTI): ICD-10-CM

## 2025-08-06 RX ORDER — TIZANIDINE 4 MG/1
4 TABLET ORAL NIGHTLY
Qty: 20 TABLET | Refills: 0 | Status: SHIPPED | OUTPATIENT
Start: 2025-08-06 | End: 2025-08-13

## 2025-08-13 DIAGNOSIS — M35.00 SICCA, UNSPECIFIED TYPE (MULTI): ICD-10-CM

## 2025-08-13 DIAGNOSIS — R76.8 POSITIVE ANA (ANTINUCLEAR ANTIBODY): ICD-10-CM

## 2025-08-13 DIAGNOSIS — M13.0 POLYARTHRITIS: ICD-10-CM

## 2025-08-13 DIAGNOSIS — M79.7 FIBROMYALGIA: ICD-10-CM

## 2025-08-13 RX ORDER — TIZANIDINE 4 MG/1
4 TABLET ORAL NIGHTLY
Qty: 20 TABLET | Refills: 0 | Status: SHIPPED | OUTPATIENT
Start: 2025-08-13

## 2025-08-14 ENCOUNTER — DOCUMENTATION (OUTPATIENT)
Dept: RHEUMATOLOGY | Facility: CLINIC | Age: 50
End: 2025-08-14
Payer: MEDICARE

## 2025-08-21 DIAGNOSIS — M79.7 FIBROMYALGIA: ICD-10-CM

## 2025-08-22 DIAGNOSIS — M79.7 FIBROMYALGIA: ICD-10-CM

## 2025-08-22 DIAGNOSIS — R76.8 POSITIVE ANA (ANTINUCLEAR ANTIBODY): ICD-10-CM

## 2025-08-22 DIAGNOSIS — M13.0 POLYARTHRITIS: ICD-10-CM

## 2025-08-22 DIAGNOSIS — M35.00 SICCA, UNSPECIFIED TYPE (MULTI): ICD-10-CM

## 2025-08-22 RX ORDER — DULOXETIN HYDROCHLORIDE 20 MG/1
20 CAPSULE, DELAYED RELEASE ORAL DAILY
Qty: 30 CAPSULE | Refills: 7 | Status: SHIPPED | OUTPATIENT
Start: 2025-08-22 | End: 2026-08-22

## 2025-08-22 RX ORDER — TIZANIDINE 4 MG/1
4 TABLET ORAL NIGHTLY
Qty: 20 TABLET | Refills: 0 | Status: SHIPPED | OUTPATIENT
Start: 2025-08-22

## 2025-09-02 PROCEDURE — 99284 EMERGENCY DEPT VISIT MOD MDM: CPT | Performed by: EMERGENCY MEDICINE

## 2025-09-02 ASSESSMENT — PAIN DESCRIPTION - DESCRIPTORS: DESCRIPTORS: SHARP

## 2025-09-02 ASSESSMENT — PAIN DESCRIPTION - PROGRESSION: CLINICAL_PROGRESSION: GRADUALLY WORSENING

## 2025-09-02 ASSESSMENT — PAIN DESCRIPTION - ONSET: ONSET: GRADUAL

## 2025-09-02 ASSESSMENT — PAIN SCALES - GENERAL: PAINLEVEL_OUTOF10: 10 - WORST POSSIBLE PAIN

## 2025-09-02 ASSESSMENT — PAIN DESCRIPTION - PAIN TYPE: TYPE: ACUTE PAIN

## 2025-09-02 ASSESSMENT — PAIN DESCRIPTION - LOCATION: LOCATION: KNEE

## 2025-09-02 ASSESSMENT — PAIN DESCRIPTION - FREQUENCY: FREQUENCY: CONSTANT/CONTINUOUS

## 2025-09-02 ASSESSMENT — PAIN DESCRIPTION - ORIENTATION: ORIENTATION: LEFT

## 2025-09-02 ASSESSMENT — PAIN - FUNCTIONAL ASSESSMENT: PAIN_FUNCTIONAL_ASSESSMENT: 0-10

## 2025-09-03 ENCOUNTER — HOSPITAL ENCOUNTER (EMERGENCY)
Facility: HOSPITAL | Age: 50
Discharge: HOME | End: 2025-09-03
Attending: EMERGENCY MEDICINE
Payer: COMMERCIAL

## 2025-09-03 ENCOUNTER — APPOINTMENT (OUTPATIENT)
Dept: RADIOLOGY | Facility: HOSPITAL | Age: 50
End: 2025-09-03
Payer: COMMERCIAL

## 2025-09-03 VITALS
HEIGHT: 65 IN | SYSTOLIC BLOOD PRESSURE: 145 MMHG | HEART RATE: 68 BPM | OXYGEN SATURATION: 98 % | RESPIRATION RATE: 16 BRPM | WEIGHT: 200 LBS | TEMPERATURE: 96.8 F | BODY MASS INDEX: 33.32 KG/M2 | DIASTOLIC BLOOD PRESSURE: 91 MMHG

## 2025-09-03 DIAGNOSIS — M79.7 FIBROMYALGIA: ICD-10-CM

## 2025-09-03 DIAGNOSIS — R76.8 POSITIVE ANA (ANTINUCLEAR ANTIBODY): ICD-10-CM

## 2025-09-03 DIAGNOSIS — M35.00 SICCA, UNSPECIFIED TYPE (MULTI): ICD-10-CM

## 2025-09-03 DIAGNOSIS — M25.562 ACUTE PAIN OF LEFT KNEE: Primary | ICD-10-CM

## 2025-09-03 DIAGNOSIS — M13.0 POLYARTHRITIS: ICD-10-CM

## 2025-09-03 LAB
ALBUMIN SERPL BCP-MCNC: 4.1 G/DL (ref 3.4–5)
ALP SERPL-CCNC: 70 U/L (ref 33–110)
ALT SERPL W P-5'-P-CCNC: 13 U/L (ref 7–45)
ANION GAP SERPL CALC-SCNC: 12 MMOL/L (ref 10–20)
AST SERPL W P-5'-P-CCNC: 15 U/L (ref 9–39)
BASOPHILS # BLD AUTO: 0.05 X10*3/UL (ref 0–0.1)
BASOPHILS NFR BLD AUTO: 0.5 %
BILIRUB SERPL-MCNC: 0.5 MG/DL (ref 0–1.2)
BUN SERPL-MCNC: 7 MG/DL (ref 6–23)
CALCIUM SERPL-MCNC: 9.9 MG/DL (ref 8.6–10.3)
CHLORIDE SERPL-SCNC: 107 MMOL/L (ref 98–107)
CLARITY FLD: ABNORMAL
CO2 SERPL-SCNC: 23 MMOL/L (ref 21–32)
COLOR FLD: ABNORMAL
CREAT SERPL-MCNC: 0.68 MG/DL (ref 0.5–1.05)
CRP SERPL-MCNC: 0.28 MG/DL
CRYSTALS FLD MICRO: NORMAL
EGFRCR SERPLBLD CKD-EPI 2021: >90 ML/MIN/1.73M*2
EOSINOPHIL # BLD AUTO: 0.36 X10*3/UL (ref 0–0.7)
EOSINOPHIL NFR BLD AUTO: 3.5 %
EOSINOPHIL NFR FLD MANUAL: NORMAL %
ERYTHROCYTE [DISTWIDTH] IN BLOOD BY AUTOMATED COUNT: 13.6 % (ref 11.5–14.5)
ERYTHROCYTE [SEDIMENTATION RATE] IN BLOOD BY WESTERGREN METHOD: 16 MM/H (ref 0–30)
GLUCOSE SERPL-MCNC: 80 MG/DL (ref 74–99)
HCT VFR BLD AUTO: 45.7 % (ref 36–46)
HGB BLD-MCNC: 15.3 G/DL (ref 12–16)
IMM GRANULOCYTES # BLD AUTO: 0.01 X10*3/UL (ref 0–0.7)
IMM GRANULOCYTES NFR BLD AUTO: 0.1 % (ref 0–0.9)
LYMPHOCYTES # BLD AUTO: 5.15 X10*3/UL (ref 1.2–4.8)
LYMPHOCYTES NFR BLD AUTO: 50.7 %
LYMPHOCYTES NFR FLD MANUAL: 76 %
MACROPHAGES NFR FLD MANUAL: NORMAL %
MCH RBC QN AUTO: 29.1 PG (ref 26–34)
MCHC RBC AUTO-ENTMCNC: 33.5 G/DL (ref 32–36)
MCV RBC AUTO: 87 FL (ref 80–100)
MONOCYTES # BLD AUTO: 0.76 X10*3/UL (ref 0.1–1)
MONOCYTES NFR BLD AUTO: 7.5 %
NEUTROPHILS # BLD AUTO: 3.82 X10*3/UL (ref 1.2–7.7)
NEUTROPHILS NFR BLD AUTO: 37.7 %
NEUTROPHILS NFR FLD MANUAL: 24 %
NRBC BLD-RTO: 0 /100 WBCS (ref 0–0)
PLATELET # BLD AUTO: 316 X10*3/UL (ref 150–450)
POTASSIUM SERPL-SCNC: 3.1 MMOL/L (ref 3.5–5.3)
PROT SERPL-MCNC: 7.1 G/DL (ref 6.4–8.2)
RBC # BLD AUTO: 5.26 X10*6/UL (ref 4–5.2)
RBC # FLD AUTO: ABNORMAL /UL
SODIUM SERPL-SCNC: 139 MMOL/L (ref 136–145)
TOTAL CELLS COUNTED FLD: 100
WBC # BLD AUTO: 10.2 X10*3/UL (ref 4.4–11.3)
WBC # FLD AUTO: 287 /UL

## 2025-09-03 PROCEDURE — 2500000004 HC RX 250 GENERAL PHARMACY W/ HCPCS (ALT 636 FOR OP/ED): Performed by: EMERGENCY MEDICINE

## 2025-09-03 PROCEDURE — 85652 RBC SED RATE AUTOMATED: CPT | Performed by: EMERGENCY MEDICINE

## 2025-09-03 PROCEDURE — 89060 EXAM SYNOVIAL FLUID CRYSTALS: CPT | Mod: AHULAB | Performed by: EMERGENCY MEDICINE

## 2025-09-03 PROCEDURE — 73562 X-RAY EXAM OF KNEE 3: CPT | Mod: LT

## 2025-09-03 PROCEDURE — 89051 BODY FLUID CELL COUNT: CPT | Performed by: EMERGENCY MEDICINE

## 2025-09-03 PROCEDURE — 85025 COMPLETE CBC W/AUTO DIFF WBC: CPT | Performed by: EMERGENCY MEDICINE

## 2025-09-03 PROCEDURE — 80053 COMPREHEN METABOLIC PANEL: CPT | Performed by: EMERGENCY MEDICINE

## 2025-09-03 PROCEDURE — 87070 CULTURE OTHR SPECIMN AEROBIC: CPT | Mod: AHULAB | Performed by: EMERGENCY MEDICINE

## 2025-09-03 PROCEDURE — 36415 COLL VENOUS BLD VENIPUNCTURE: CPT | Performed by: EMERGENCY MEDICINE

## 2025-09-03 PROCEDURE — 73562 X-RAY EXAM OF KNEE 3: CPT | Mod: LEFT SIDE | Performed by: RADIOLOGY

## 2025-09-03 PROCEDURE — 86140 C-REACTIVE PROTEIN: CPT | Performed by: EMERGENCY MEDICINE

## 2025-09-03 RX ORDER — LIDOCAINE HYDROCHLORIDE AND EPINEPHRINE 10; 10 UG/ML; MG/ML
10 INJECTION, SOLUTION INFILTRATION; PERINEURAL ONCE
Status: COMPLETED | OUTPATIENT
Start: 2025-09-03 | End: 2025-09-03

## 2025-09-03 RX ORDER — ORPHENADRINE CITRATE 30 MG/ML
60 INJECTION INTRAMUSCULAR; INTRAVENOUS ONCE
Status: COMPLETED | OUTPATIENT
Start: 2025-09-03 | End: 2025-09-03

## 2025-09-03 RX ORDER — TIZANIDINE 4 MG/1
4 TABLET ORAL NIGHTLY
Qty: 20 TABLET | Refills: 0 | Status: SHIPPED | OUTPATIENT
Start: 2025-09-03

## 2025-09-03 RX ORDER — MIDAZOLAM HYDROCHLORIDE 5 MG/ML
5 INJECTION INTRAMUSCULAR; INTRAVENOUS ONCE
Status: COMPLETED | OUTPATIENT
Start: 2025-09-03 | End: 2025-09-03

## 2025-09-03 RX ORDER — PREDNISONE 20 MG/1
40 TABLET ORAL DAILY
Qty: 10 TABLET | Refills: 0 | Status: SHIPPED | OUTPATIENT
Start: 2025-09-03 | End: 2025-09-08

## 2025-09-03 RX ORDER — KETOROLAC TROMETHAMINE 30 MG/ML
30 INJECTION, SOLUTION INTRAMUSCULAR; INTRAVENOUS ONCE
Status: COMPLETED | OUTPATIENT
Start: 2025-09-03 | End: 2025-09-03

## 2025-09-03 RX ADMIN — ORPHENADRINE CITRATE 60 MG: 60 INJECTION INTRAMUSCULAR; INTRAVENOUS at 02:55

## 2025-09-03 RX ADMIN — KETOROLAC TROMETHAMINE 30 MG: 30 INJECTION, SOLUTION INTRAMUSCULAR at 02:55

## 2025-09-03 RX ADMIN — LIDOCAINE HYDROCHLORIDE,EPINEPHRINE BITARTRATE 10 ML: 10; .01 INJECTION, SOLUTION INFILTRATION; PERINEURAL at 03:14

## 2025-09-03 RX ADMIN — MIDAZOLAM 5 MG: 5 INJECTION INTRAMUSCULAR; INTRAVENOUS at 03:13

## 2025-09-03 ASSESSMENT — PAIN SCALES - GENERAL
PAINLEVEL_OUTOF10: 10 - WORST POSSIBLE PAIN
PAINLEVEL_OUTOF10: 4
PAINLEVEL_OUTOF10: 10 - WORST POSSIBLE PAIN

## 2025-09-03 ASSESSMENT — PAIN - FUNCTIONAL ASSESSMENT
PAIN_FUNCTIONAL_ASSESSMENT: 0-10

## 2025-09-03 ASSESSMENT — PAIN DESCRIPTION - ORIENTATION
ORIENTATION: LEFT

## 2025-09-03 ASSESSMENT — PAIN DESCRIPTION - DESCRIPTORS
DESCRIPTORS: ACHING;BURNING;DISCOMFORT
DESCRIPTORS: ACHING;DISCOMFORT

## 2025-09-03 ASSESSMENT — PAIN DESCRIPTION - LOCATION
LOCATION: KNEE

## 2025-09-03 ASSESSMENT — PAIN DESCRIPTION - PAIN TYPE
TYPE: ACUTE PAIN
TYPE: ACUTE PAIN

## 2025-09-06 LAB
BACTERIA FLD CULT: NORMAL
GRAM STN SPEC: NORMAL
GRAM STN SPEC: NORMAL

## 2026-02-20 ENCOUNTER — APPOINTMENT (OUTPATIENT)
Dept: RHEUMATOLOGY | Facility: CLINIC | Age: 51
End: 2026-02-20
Payer: MEDICARE